# Patient Record
Sex: MALE | Race: WHITE | NOT HISPANIC OR LATINO | Employment: UNEMPLOYED | URBAN - METROPOLITAN AREA
[De-identification: names, ages, dates, MRNs, and addresses within clinical notes are randomized per-mention and may not be internally consistent; named-entity substitution may affect disease eponyms.]

---

## 2024-10-25 ENCOUNTER — APPOINTMENT (EMERGENCY)
Dept: RADIOLOGY | Facility: HOSPITAL | Age: 44
End: 2024-10-25
Payer: COMMERCIAL

## 2024-10-25 ENCOUNTER — HOSPITAL ENCOUNTER (INPATIENT)
Facility: HOSPITAL | Age: 44
LOS: 2 days | Discharge: HOME | DRG: 641 | End: 2024-10-27
Attending: INTERNAL MEDICINE | Admitting: INTERNAL MEDICINE
Payer: COMMERCIAL

## 2024-10-25 ENCOUNTER — HOSPITAL ENCOUNTER (EMERGENCY)
Facility: HOSPITAL | Age: 44
End: 2024-10-25
Attending: EMERGENCY MEDICINE
Payer: COMMERCIAL

## 2024-10-25 VITALS
HEIGHT: 76 IN | WEIGHT: 215 LBS | RESPIRATION RATE: 13 BRPM | BODY MASS INDEX: 26.18 KG/M2 | HEART RATE: 83 BPM | OXYGEN SATURATION: 97 % | TEMPERATURE: 99.2 F | SYSTOLIC BLOOD PRESSURE: 146 MMHG | DIASTOLIC BLOOD PRESSURE: 76 MMHG

## 2024-10-25 DIAGNOSIS — E87.1 HYPONATREMIA: Primary | ICD-10-CM

## 2024-10-25 DIAGNOSIS — E87.29 ALCOHOLIC KETOACIDOSIS: Primary | ICD-10-CM

## 2024-10-25 DIAGNOSIS — R10.13 EPIGASTRIC PAIN: ICD-10-CM

## 2024-10-25 DIAGNOSIS — E87.8 ELECTROLYTE ABNORMALITY: ICD-10-CM

## 2024-10-25 PROBLEM — R79.89 ELEVATED LFTS: Status: ACTIVE | Noted: 2024-10-25

## 2024-10-25 PROBLEM — F10.939 ALCOHOL WITHDRAWAL (HCC): Status: ACTIVE | Noted: 2024-10-25

## 2024-10-25 PROBLEM — D69.6 THROMBOCYTOPENIA (HCC): Status: ACTIVE | Noted: 2024-10-25

## 2024-10-25 LAB
ALBUMIN SERPL BCG-MCNC: 5 G/DL (ref 3.5–5)
ALP SERPL-CCNC: 85 U/L (ref 34–104)
ALT SERPL W P-5'-P-CCNC: 290 U/L (ref 7–52)
ANION GAP SERPL CALCULATED.3IONS-SCNC: 18 MMOL/L (ref 4–13)
ANION GAP SERPL CALCULATED.3IONS-SCNC: 28 MMOL/L (ref 4–13)
APTT PPP: 25 SECONDS (ref 23–34)
AST SERPL W P-5'-P-CCNC: 313 U/L (ref 13–39)
ATRIAL RATE: 99 BPM
B-OH-BUTYR SERPL-MCNC: 4.75 MMOL/L (ref 0.02–0.27)
BACTERIA UR QL AUTO: ABNORMAL /HPF
BASE EX.OXY STD BLDV CALC-SCNC: 94.7 % (ref 60–80)
BASE EXCESS BLDV CALC-SCNC: -4.9 MMOL/L
BASOPHILS # BLD AUTO: 0.07 THOUSANDS/ΜL (ref 0–0.1)
BASOPHILS NFR BLD AUTO: 1 % (ref 0–1)
BILIRUB SERPL-MCNC: 1.46 MG/DL (ref 0.2–1)
BILIRUB UR QL STRIP: ABNORMAL
BUN SERPL-MCNC: 12 MG/DL (ref 5–25)
BUN SERPL-MCNC: 15 MG/DL (ref 5–25)
CALCIUM SERPL-MCNC: 7.9 MG/DL (ref 8.4–10.2)
CALCIUM SERPL-MCNC: 8.9 MG/DL (ref 8.4–10.2)
CHLORIDE SERPL-SCNC: 85 MMOL/L (ref 96–108)
CHLORIDE SERPL-SCNC: 90 MMOL/L (ref 96–108)
CLARITY UR: CLEAR
CO2 SERPL-SCNC: 18 MMOL/L (ref 21–32)
CO2 SERPL-SCNC: 20 MMOL/L (ref 21–32)
COLOR UR: ABNORMAL
CREAT SERPL-MCNC: 0.76 MG/DL (ref 0.6–1.3)
CREAT SERPL-MCNC: 0.92 MG/DL (ref 0.6–1.3)
EOSINOPHIL # BLD AUTO: 0.04 THOUSAND/ΜL (ref 0–0.61)
EOSINOPHIL NFR BLD AUTO: 1 % (ref 0–6)
ERYTHROCYTE [DISTWIDTH] IN BLOOD BY AUTOMATED COUNT: 11.4 % (ref 11.6–15.1)
ETHANOL SERPL-MCNC: 190 MG/DL
FINE GRAN CASTS URNS QL MICRO: ABNORMAL /LPF
GFR SERPL CREATININE-BSD FRML MDRD: 102 ML/MIN/1.73SQ M
GFR SERPL CREATININE-BSD FRML MDRD: 112 ML/MIN/1.73SQ M
GLUCOSE SERPL-MCNC: 135 MG/DL (ref 65–140)
GLUCOSE SERPL-MCNC: 147 MG/DL (ref 65–140)
GLUCOSE UR STRIP-MCNC: NEGATIVE MG/DL
HCO3 BLDV-SCNC: 18.3 MMOL/L (ref 24–30)
HCT VFR BLD AUTO: 45.8 % (ref 36.5–49.3)
HGB BLD-MCNC: 16.7 G/DL (ref 12–17)
HGB UR QL STRIP.AUTO: ABNORMAL
HYALINE CASTS #/AREA URNS LPF: ABNORMAL /LPF
IMM GRANULOCYTES # BLD AUTO: 0.09 THOUSAND/UL (ref 0–0.2)
IMM GRANULOCYTES NFR BLD AUTO: 1 % (ref 0–2)
INR PPP: 0.98 (ref 0.85–1.19)
KETONES UR STRIP-MCNC: ABNORMAL MG/DL
LACTATE SERPL-SCNC: 4.1 MMOL/L (ref 0.5–2)
LACTATE SERPL-SCNC: 5.7 MMOL/L (ref 0.5–2)
LACTATE SERPL-SCNC: 6.3 MMOL/L (ref 0.5–2)
LEUKOCYTE ESTERASE UR QL STRIP: NEGATIVE
LIPASE SERPL-CCNC: 54 U/L (ref 11–82)
LYMPHOCYTES # BLD AUTO: 1.11 THOUSANDS/ΜL (ref 0.6–4.47)
LYMPHOCYTES NFR BLD AUTO: 14 % (ref 14–44)
MAGNESIUM SERPL-MCNC: 1.5 MG/DL (ref 1.9–2.7)
MAGNESIUM SERPL-MCNC: 2.3 MG/DL (ref 1.9–2.7)
MCH RBC QN AUTO: 31.2 PG (ref 26.8–34.3)
MCHC RBC AUTO-ENTMCNC: 36.5 G/DL (ref 31.4–37.4)
MCV RBC AUTO: 85 FL (ref 82–98)
MONOCYTES # BLD AUTO: 0.49 THOUSAND/ΜL (ref 0.17–1.22)
MONOCYTES NFR BLD AUTO: 6 % (ref 4–12)
NEUTROPHILS # BLD AUTO: 6.34 THOUSANDS/ΜL (ref 1.85–7.62)
NEUTS SEG NFR BLD AUTO: 77 % (ref 43–75)
NITRITE UR QL STRIP: NEGATIVE
NON-SQ EPI CELLS URNS QL MICRO: ABNORMAL /HPF
NRBC BLD AUTO-RTO: 0 /100 WBCS
O2 CT BLDV-SCNC: 21.1 ML/DL
P AXIS: 62 DEGREES
PCO2 BLDV: 29.9 MM HG (ref 42–50)
PH BLDV: 7.41 [PH] (ref 7.3–7.4)
PH UR STRIP.AUTO: 6 [PH]
PHOSPHATE SERPL-MCNC: 1.1 MG/DL (ref 2.7–4.5)
PLATELET # BLD AUTO: 82 THOUSANDS/UL (ref 149–390)
PMV BLD AUTO: 10.1 FL (ref 8.9–12.7)
PO2 BLDV: 92.5 MM HG (ref 35–45)
POTASSIUM SERPL-SCNC: 2.9 MMOL/L (ref 3.5–5.3)
POTASSIUM SERPL-SCNC: 3 MMOL/L (ref 3.5–5.3)
PR INTERVAL: 160 MS
PROT SERPL-MCNC: 7.5 G/DL (ref 6.4–8.4)
PROT UR STRIP-MCNC: ABNORMAL MG/DL
PROTHROMBIN TIME: 13.4 SECONDS (ref 12.3–15)
QRS AXIS: 26 DEGREES
QRSD INTERVAL: 102 MS
QT INTERVAL: 372 MS
QTC INTERVAL: 477 MS
RBC # BLD AUTO: 5.36 MILLION/UL (ref 3.88–5.62)
RBC #/AREA URNS AUTO: ABNORMAL /HPF
SODIUM 24H UR-SCNC: 15 MOL/L
SODIUM SERPL-SCNC: 128 MMOL/L (ref 135–147)
SODIUM SERPL-SCNC: 131 MMOL/L (ref 135–147)
SP GR UR STRIP.AUTO: >=1.03 (ref 1–1.03)
T WAVE AXIS: 55 DEGREES
URATE SERPL-MCNC: 7.8 MG/DL (ref 3.5–8.5)
UROBILINOGEN UR QL STRIP.AUTO: 0.2 E.U./DL
VENTRICULAR RATE: 99 BPM
WBC # BLD AUTO: 8.14 THOUSAND/UL (ref 4.31–10.16)
WBC #/AREA URNS AUTO: ABNORMAL /HPF

## 2024-10-25 PROCEDURE — 99222 1ST HOSP IP/OBS MODERATE 55: CPT | Performed by: NURSE PRACTITIONER

## 2024-10-25 PROCEDURE — 84550 ASSAY OF BLOOD/URIC ACID: CPT | Performed by: NURSE PRACTITIONER

## 2024-10-25 PROCEDURE — 96367 TX/PROPH/DG ADDL SEQ IV INF: CPT

## 2024-10-25 PROCEDURE — 83605 ASSAY OF LACTIC ACID: CPT | Performed by: EMERGENCY MEDICINE

## 2024-10-25 PROCEDURE — 93005 ELECTROCARDIOGRAM TRACING: CPT

## 2024-10-25 PROCEDURE — 82010 KETONE BODYS QUAN: CPT | Performed by: EMERGENCY MEDICINE

## 2024-10-25 PROCEDURE — 96366 THER/PROPH/DIAG IV INF ADDON: CPT

## 2024-10-25 PROCEDURE — 93010 ELECTROCARDIOGRAM REPORT: CPT | Performed by: INTERNAL MEDICINE

## 2024-10-25 PROCEDURE — 83036 HEMOGLOBIN GLYCOSYLATED A1C: CPT | Performed by: NURSE PRACTITIONER

## 2024-10-25 PROCEDURE — 81003 URINALYSIS AUTO W/O SCOPE: CPT | Performed by: EMERGENCY MEDICINE

## 2024-10-25 PROCEDURE — 84300 ASSAY OF URINE SODIUM: CPT | Performed by: NURSE PRACTITIONER

## 2024-10-25 PROCEDURE — 84100 ASSAY OF PHOSPHORUS: CPT | Performed by: NURSE PRACTITIONER

## 2024-10-25 PROCEDURE — 85730 THROMBOPLASTIN TIME PARTIAL: CPT | Performed by: EMERGENCY MEDICINE

## 2024-10-25 PROCEDURE — 83735 ASSAY OF MAGNESIUM: CPT | Performed by: EMERGENCY MEDICINE

## 2024-10-25 PROCEDURE — 96365 THER/PROPH/DIAG IV INF INIT: CPT

## 2024-10-25 PROCEDURE — 85025 COMPLETE CBC W/AUTO DIFF WBC: CPT | Performed by: EMERGENCY MEDICINE

## 2024-10-25 PROCEDURE — 85610 PROTHROMBIN TIME: CPT | Performed by: EMERGENCY MEDICINE

## 2024-10-25 PROCEDURE — 83935 ASSAY OF URINE OSMOLALITY: CPT | Performed by: NURSE PRACTITIONER

## 2024-10-25 PROCEDURE — 80053 COMPREHEN METABOLIC PANEL: CPT | Performed by: EMERGENCY MEDICINE

## 2024-10-25 PROCEDURE — 83735 ASSAY OF MAGNESIUM: CPT | Performed by: NURSE PRACTITIONER

## 2024-10-25 PROCEDURE — 80048 BASIC METABOLIC PNL TOTAL CA: CPT | Performed by: NURSE PRACTITIONER

## 2024-10-25 PROCEDURE — 82077 ASSAY SPEC XCP UR&BREATH IA: CPT | Performed by: EMERGENCY MEDICINE

## 2024-10-25 PROCEDURE — 83690 ASSAY OF LIPASE: CPT | Performed by: EMERGENCY MEDICINE

## 2024-10-25 PROCEDURE — 71045 X-RAY EXAM CHEST 1 VIEW: CPT

## 2024-10-25 PROCEDURE — 99284 EMERGENCY DEPT VISIT MOD MDM: CPT

## 2024-10-25 PROCEDURE — 36415 COLL VENOUS BLD VENIPUNCTURE: CPT | Performed by: EMERGENCY MEDICINE

## 2024-10-25 PROCEDURE — 81001 URINALYSIS AUTO W/SCOPE: CPT | Performed by: EMERGENCY MEDICINE

## 2024-10-25 PROCEDURE — 82805 BLOOD GASES W/O2 SATURATION: CPT | Performed by: EMERGENCY MEDICINE

## 2024-10-25 RX ORDER — FAMOTIDINE 10 MG/ML
20 INJECTION, SOLUTION INTRAVENOUS EVERY 12 HOURS SCHEDULED
Status: DISCONTINUED | OUTPATIENT
Start: 2024-10-25 | End: 2024-10-27 | Stop reason: HOSPADM

## 2024-10-25 RX ORDER — POTASSIUM CHLORIDE 14.9 MG/ML
20 INJECTION INTRAVENOUS ONCE
Status: COMPLETED | OUTPATIENT
Start: 2024-10-25 | End: 2024-10-26

## 2024-10-25 RX ORDER — LANOLIN ALCOHOL/MO/W.PET/CERES
100 CREAM (GRAM) TOPICAL DAILY
Status: DISCONTINUED | OUTPATIENT
Start: 2024-10-26 | End: 2024-10-27 | Stop reason: HOSPADM

## 2024-10-25 RX ORDER — DEXTROSE MONOHYDRATE AND SODIUM CHLORIDE 5; .9 G/100ML; G/100ML
200 INJECTION, SOLUTION INTRAVENOUS CONTINUOUS
Status: DISCONTINUED | OUTPATIENT
Start: 2024-10-25 | End: 2024-10-25 | Stop reason: HOSPADM

## 2024-10-25 RX ORDER — POTASSIUM CHLORIDE 29.8 MG/ML
40 INJECTION INTRAVENOUS ONCE
Status: DISCONTINUED | OUTPATIENT
Start: 2024-10-25 | End: 2024-10-25

## 2024-10-25 RX ORDER — ONDANSETRON 2 MG/ML
4 INJECTION INTRAMUSCULAR; INTRAVENOUS EVERY 6 HOURS PRN
Status: DISCONTINUED | OUTPATIENT
Start: 2024-10-25 | End: 2024-10-27 | Stop reason: HOSPADM

## 2024-10-25 RX ORDER — POTASSIUM CHLORIDE 1500 MG/1
40 TABLET, EXTENDED RELEASE ORAL ONCE
Status: COMPLETED | OUTPATIENT
Start: 2024-10-25 | End: 2024-10-25

## 2024-10-25 RX ORDER — DEXTROSE MONOHYDRATE AND SODIUM CHLORIDE 5; .9 G/100ML; G/100ML
150 INJECTION, SOLUTION INTRAVENOUS CONTINUOUS
Status: DISCONTINUED | OUTPATIENT
Start: 2024-10-25 | End: 2024-10-26

## 2024-10-25 RX ORDER — FOLIC ACID 1 MG/1
1 TABLET ORAL DAILY
Status: DISCONTINUED | OUTPATIENT
Start: 2024-10-26 | End: 2024-10-27 | Stop reason: HOSPADM

## 2024-10-25 RX ORDER — POTASSIUM CHLORIDE 14.9 MG/ML
20 INJECTION INTRAVENOUS ONCE
Status: COMPLETED | OUTPATIENT
Start: 2024-10-25 | End: 2024-10-25

## 2024-10-25 RX ORDER — CHLORDIAZEPOXIDE HYDROCHLORIDE 25 MG/1
25 CAPSULE, GELATIN COATED ORAL EVERY 6 HOURS SCHEDULED
Status: DISCONTINUED | OUTPATIENT
Start: 2024-10-25 | End: 2024-10-27

## 2024-10-25 RX ORDER — LABETALOL HYDROCHLORIDE 5 MG/ML
10 INJECTION, SOLUTION INTRAVENOUS EVERY 6 HOURS PRN
Status: DISCONTINUED | OUTPATIENT
Start: 2024-10-25 | End: 2024-10-27 | Stop reason: HOSPADM

## 2024-10-25 RX ORDER — MAGNESIUM SULFATE HEPTAHYDRATE 40 MG/ML
2 INJECTION, SOLUTION INTRAVENOUS ONCE
Status: COMPLETED | OUTPATIENT
Start: 2024-10-25 | End: 2024-10-26

## 2024-10-25 RX ADMIN — MAGNESIUM SULFATE HEPTAHYDRATE 2 G: 40 INJECTION, SOLUTION INTRAVENOUS at 22:43

## 2024-10-25 RX ADMIN — CHLORDIAZEPOXIDE HYDROCHLORIDE 25 MG: 25 CAPSULE ORAL at 20:33

## 2024-10-25 RX ADMIN — POTASSIUM CHLORIDE 40 MEQ: 1500 TABLET, EXTENDED RELEASE ORAL at 12:26

## 2024-10-25 RX ADMIN — DEXTROSE AND SODIUM CHLORIDE 200 ML/HR: 5; .9 INJECTION, SOLUTION INTRAVENOUS at 15:13

## 2024-10-25 RX ADMIN — FOLIC ACID 1 MG: 5 INJECTION, SOLUTION INTRAMUSCULAR; INTRAVENOUS; SUBCUTANEOUS at 13:53

## 2024-10-25 RX ADMIN — POTASSIUM PHOSPHATE, MONOBASIC AND POTASSIUM PHOSPHATE, DIBASIC 21 MMOL: 224; 236 INJECTION, SOLUTION, CONCENTRATE INTRAVENOUS at 23:15

## 2024-10-25 RX ADMIN — DEXTROSE AND SODIUM CHLORIDE 150 ML/HR: 5; .9 INJECTION, SOLUTION INTRAVENOUS at 20:07

## 2024-10-25 RX ADMIN — POTASSIUM CHLORIDE 20 MEQ: 14.9 INJECTION, SOLUTION INTRAVENOUS at 20:33

## 2024-10-25 RX ADMIN — SODIUM CHLORIDE 1000 ML: 0.9 INJECTION, SOLUTION INTRAVENOUS at 12:24

## 2024-10-25 RX ADMIN — POTASSIUM CHLORIDE 40 MEQ: 1500 TABLET, EXTENDED RELEASE ORAL at 22:35

## 2024-10-25 RX ADMIN — POTASSIUM CHLORIDE 20 MEQ: 14.9 INJECTION, SOLUTION INTRAVENOUS at 22:35

## 2024-10-25 RX ADMIN — THIAMINE HYDROCHLORIDE 100 MG: 100 INJECTION, SOLUTION INTRAMUSCULAR; INTRAVENOUS at 12:45

## 2024-10-25 RX ADMIN — FAMOTIDINE 20 MG: 10 INJECTION, SOLUTION INTRAVENOUS at 20:33

## 2024-10-25 NOTE — ED NOTES
Pt father, Donte, called asking update of status. Pt gave verbal consent to speak with him, asking for update when we know if he is discharged or admitted.      Mally Pena RN  10/25/24 [id number]

## 2024-10-25 NOTE — ED NOTES
Urinated in a cup and was attempting to throw it in the garbage when intercepted by RN.  Assisted back to bed and instructed to use call bell for assistance.  Urinal placed within reach     Nida Elliott RN  10/25/24 1155

## 2024-10-25 NOTE — EMTALA/ACUTE CARE TRANSFER
[facility] EMERGENCY DEPARTMENT  [address] [place] [id number]  Dept: [phone]      EMTALA TRANSFER CONSENT    NAME Tres Deutsch                                         1980                              MRN [id number]    I have been informed of my rights regarding examination, treatment, and transfer   by Dr. Mariajose Cota MD    Benefits: Specialized equipment and/or services available at the receiving facility (Include comment)________________________    Risks: Potential deterioration of medical condition, Loss of IV, Increased discomfort during transfer      Consent for Transfer:  I acknowledge that my medical condition has been evaluated and explained to me by the emergency department physician or other qualified medical person and/or my attending physician, who has recommended that I be transferred to the service of  Accepting Physician: Dr. Petersen at Accepting Facility Name, City & State : [facility]. The above potential benefits of such transfer, the potential risks associated with such transfer, and the probable risks of not being transferred have been explained to me, and I fully understand them.  The doctor has explained that, in my case, the benefits of transfer outweigh the risks.  I agree to be transferred.    I authorize the performance of emergency medical procedures and treatments upon me in both transit and upon arrival at the receiving facility.  Additionally, I authorize the release of any and all medical records to the receiving facility and request they be transported with me, if possible.  I understand that the safest mode of transportation during a medical emergency is an ambulance and that the Hospital advocates the use of this mode of transport. Risks of traveling to the receiving facility by car, including absence of medical control, life sustaining equipment, such as oxygen, and medical personnel has been explained to me and I fully understand  them.    (CARLOS ENRIQUE CORRECT BOX BELOW)  [  ]  I consent to the stated transfer and to be transported by ambulance/helicopter.  [  ]  I consent to the stated transfer, but refuse transportation by ambulance and accept full responsibility for my transportation by car.  I understand the risks of non-ambulance transfers and I exonerate the Hospital and its staff from any deterioration in my condition that results from this refusal.    X___________________________________________    DATE  10/25/24  TIME________  Signature of patient or legally responsible individual signing on patient behalf           RELATIONSHIP TO PATIENT_________________________          Provider Certification    NAME Tres Deutsch                                         1980                              MRN [id number]    A medical screening exam was performed on the above named patient.  Based on the examination:    Condition Necessitating Transfer The encounter diagnosis was Alcoholic ketoacidosis.    Patient Condition: The patient has been stabilized such that within reasonable medical probability, no material deterioration of the patient condition or the condition of the unborn child(nathan) is likely to result from the transfer    Reason for Transfer: Level of Care needed not available at this facility    Transfer Requirements: Facility SLW   Space available and qualified personnel available for treatment as acknowledged by    Agreed to accept transfer and to provide appropriate medical treatment as acknowledged by       Dr. Petersen  Appropriate medical records of the examination and treatment of the patient are provided at the time of transfer   STAFF INITIAL WHEN COMPLETED _______  Transfer will be performed by qualified personnel from    and appropriate transfer equipment as required, including the use of necessary and appropriate life support measures.    Provider Certification: I have examined the patient and explained the following  risks and benefits of being transferred/refusing transfer to the patient/family:  General risk, such as traffic hazards, adverse weather conditions, rough terrain or turbulence, possible failure of equipment (including vehicle or aircraft), or consequences of actions of persons outside the control of the transport personnel, Unanticipated needs of medical equipment and personnel during transport      Based on these reasonable risks and benefits to the patient and/or the unborn child(nathan), and based upon the information available at the time of the patient’s examination, I certify that the medical benefits reasonably to be expected from the provision of appropriate medical treatments at another medical facility outweigh the increasing risks, if any, to the individual’s medical condition, and in the case of labor to the unborn child, from effecting the transfer.    X____________________________________________ DATE 10/25/24        TIME_______      ORIGINAL - SEND TO MEDICAL RECORDS   COPY - SEND WITH PATIENT DURING TRANSFER

## 2024-10-25 NOTE — ED NOTES
Message received from Deonna in the lab for redraw of vbg.  Done and hand walked to lab. Lab also aware that extra blue tube for pt inr ptt redraw that was ordered.     Nida Elliott RN  10/25/24 1129       Nida Elliott RN  10/25/24 6705

## 2024-10-25 NOTE — ED PROVIDER NOTES
"  ED Disposition       None          Assessment & Plan       Medical Decision Making  44-year-old male sent in for evaluation for recent heavy alcohol use.  Patient is alert and oriented and ambulates steadily into the department but reports generally feeling poorly.  Moderately hypertensive with otherwise normal vitals on arrival.  Some scattered bruising on exam but soft nontender abdomen and clear lungs.  No appreciable jaundice.  Initial labs notable for hypokalemia to 3.0, anion gap of 28, bicarbonate of 18, elevated ketones, concerning for alcoholic ketoacidosis.  Initial lactate 6.3.  No suspicion for infectious process at this time.  1 L of IV fluids given.  Potassium repleted.  Thiamine and folate given followed by initiation of D5 normal saline infusion.  Chest x-ray shows no acute disease per my independent interpretation.  Repeat lactate at 1.5 hours decreased to 5.7.  I discussed the patient with the hospitalist at [facility] who is concerned that given patient's history of severe withdrawal he would be better served by admission at a hospital with ICU capabilities although he does not meet them at this time.  Will plan for transfer to [facility].  Accepted by Dr. Petersen.    Amount and/or Complexity of Data Reviewed  Labs: ordered.  Radiology: ordered.    Risk  Prescription drug management.             Medications - No data to display    ED Risk Strat Scores                                               History of Present Illness       Chief Complaint   Patient presents with    Alcohol Problem     Patient brought in by EMS from home due to concerns of alcohol intoxication. EMS was called by family (living in [place]) because they didn't hear from patient for several days. Upon arrival to pt's apartment, pt found asleep in bed. Patient states, \"I just got a new apartment and wanted to celebrate\". Pt recently discharged from Rehab. Pt reports drinking 3-4 days a week and about a fifth of Vodka each " day. Last drink around 0700.     Fall     Unknown fall. Bruising present to R flank and multiple abrasions to pt's toes.        Past Medical History:   Diagnosis Date    Alcohol abuse       No past surgical history on file.   No family history on file.       E-Cigarette/Vaping      E-Cigarette/Vaping Substances      I have reviewed and agree with the history as documented.     44-year-old male history of alcohol use disorder presents with complaint of alcohol intoxication.  Family was concerned that they had not heard from him in a few days went to check on him and found him in bed.  He reports having some minor falls in his home recently.  He also complains of feeling thirsty.  He denies hitting his head.  He reports recently getting out of rehab and is not seeking to return to rehab at this time.  Patient states that he was in [place] out of state [place] days and just moved back to the area this past Saturday which was when he started drinking again.  He has been drinking approximately 1/5 of vodka daily.  He does have a history of withdrawal seizures and severe withdrawal requiring ICU.  Last drink was this morning.        Review of Systems   All other systems reviewed and are negative.          Objective       ED Triage Vitals [10/25/24 1128]   Temperature Pulse Blood Pressure Respirations SpO2 Patient Position - Orthostatic VS   99.2 °F (37.3 °C) 93 168/92 14 96 % Lying      Temp Source Heart Rate Source BP Location FiO2 (%) Pain Score    Axillary Monitor Left arm -- No Pain      Vitals      Date and Time Temp Pulse SpO2 Resp BP Pain Score FACES Pain Rating User   10/25/24 1128 99.2 °F (37.3 °C) 93 96 % 14 168/92 No Pain -- DG            Physical Exam  Constitutional:       General: He is not in acute distress.     Comments: Tired appearing   HENT:      Head: Normocephalic and atraumatic.      Mouth/Throat:      Mouth: Mucous membranes are dry.   Eyes:      Conjunctiva/sclera: Conjunctivae normal.       Pupils: Pupils are equal, round, and reactive to light.   Cardiovascular:      Rate and Rhythm: Normal rate and regular rhythm.      Heart sounds: Normal heart sounds.   Pulmonary:      Effort: Pulmonary effort is normal.      Breath sounds: Normal breath sounds.   Abdominal:      Palpations: Abdomen is soft.      Tenderness: There is no abdominal tenderness.      Comments: Scattered bruises to patient's flanks   Musculoskeletal:      Cervical back: Neck supple.   Skin:     General: Skin is warm and dry.      Coloration: Skin is not jaundiced.      Findings: Bruising present.   Neurological:      General: No focal deficit present.      Mental Status: He is alert and oriented to person, place, and time.      Comments: Ambulated steadily into treatment room   Psychiatric:         Behavior: Behavior normal.         Results Reviewed       Procedure Component Value Units Date/Time    CBC and differential [[id number]]     Lab Status: No result Specimen: Blood     Comprehensive metabolic panel [[id number]]     Lab Status: No result Specimen: Blood     Magnesium [[id number]]     Lab Status: No result Specimen: Blood     UA (URINE) with reflex to Scope [[id number]]     Lab Status: No result Specimen: Urine             XR chest 1 view portable    (Results Pending)       CriticalCare Time    Date/Time: 10/25/2024 8:03 PM    Performed by: Mariajose Cota MD  Authorized by: Mariajose Cota MD    Critical care provider statement:     Critical care time (minutes):  30    Critical care time was exclusive of:  Separately billable procedures and treating other patients    Critical care was necessary to treat or prevent imminent or life-threatening deterioration of the following conditions:  Metabolic crisis and dehydration    Critical care was time spent personally by me on the following activities:  Obtaining history from patient or surrogate, development of treatment plan with patient or surrogate, discussions with  consultants, evaluation of patient's response to treatment, examination of patient, ordering and review of laboratory studies, ordering and performing treatments and interventions and re-evaluation of patient's condition      ED Medication and Procedure Management   None     Patient's Medications    No medications on file     No discharge procedures on file.  ED SEPSIS DOCUMENTATION            Mariajose Cota MD  10/25/24 2003

## 2024-10-26 ENCOUNTER — APPOINTMENT (OUTPATIENT)
Dept: RADIOLOGY | Facility: HOSPITAL | Age: 44
DRG: 641 | End: 2024-10-26
Payer: COMMERCIAL

## 2024-10-26 ENCOUNTER — APPOINTMENT (INPATIENT)
Dept: NON INVASIVE DIAGNOSTICS | Facility: HOSPITAL | Age: 44
DRG: 641 | End: 2024-10-26
Payer: COMMERCIAL

## 2024-10-26 PROBLEM — E87.8 ELECTROLYTE ABNORMALITY: Status: ACTIVE | Noted: 2024-10-26

## 2024-10-26 PROBLEM — R79.89 ELEVATED TROPONIN: Status: ACTIVE | Noted: 2024-10-26

## 2024-10-26 LAB
2HR DELTA HS TROPONIN: -315 NG/L
4HR DELTA HS TROPONIN: -128 NG/L
ALBUMIN SERPL BCG-MCNC: 3.8 G/DL (ref 3.5–5)
ALP SERPL-CCNC: 55 U/L (ref 34–104)
ALT SERPL W P-5'-P-CCNC: 189 U/L (ref 7–52)
ANION GAP SERPL CALCULATED.3IONS-SCNC: 11 MMOL/L (ref 4–13)
ANION GAP SERPL CALCULATED.3IONS-SCNC: 11 MMOL/L (ref 4–13)
ANION GAP SERPL CALCULATED.3IONS-SCNC: 16 MMOL/L (ref 4–13)
ANION GAP SERPL CALCULATED.3IONS-SCNC: 9 MMOL/L (ref 4–13)
ANION GAP SERPL CALCULATED.3IONS-SCNC: 9 MMOL/L (ref 4–13)
AORTIC ROOT: 4 CM
APICAL FOUR CHAMBER EJECTION FRACTION: 75 %
ASCENDING AORTA: 3.3 CM
AST SERPL W P-5'-P-CCNC: 212 U/L (ref 13–39)
ATRIAL RATE: 85 BPM
ATRIAL RATE: 89 BPM
B-OH-BUTYR SERPL-MCNC: 2.01 MMOL/L (ref 0.02–0.27)
BILIRUB SERPL-MCNC: 1.42 MG/DL (ref 0.2–1)
BSA FOR ECHO PROCEDURE: 2.28 M2
BUN SERPL-MCNC: 10 MG/DL (ref 5–25)
BUN SERPL-MCNC: 10 MG/DL (ref 5–25)
BUN SERPL-MCNC: 12 MG/DL (ref 5–25)
BUN SERPL-MCNC: 7 MG/DL (ref 5–25)
BUN SERPL-MCNC: 9 MG/DL (ref 5–25)
CALCIUM SERPL-MCNC: 7.5 MG/DL (ref 8.4–10.2)
CALCIUM SERPL-MCNC: 7.7 MG/DL (ref 8.4–10.2)
CALCIUM SERPL-MCNC: 7.7 MG/DL (ref 8.4–10.2)
CALCIUM SERPL-MCNC: 8.1 MG/DL (ref 8.4–10.2)
CALCIUM SERPL-MCNC: 8.9 MG/DL (ref 8.4–10.2)
CARDIAC TROPONIN I PNL SERPL HS: 193 NG/L
CARDIAC TROPONIN I PNL SERPL HS: 321 NG/L
CARDIAC TROPONIN I PNL SERPL HS: 6 NG/L
CHLORIDE SERPL-SCNC: 92 MMOL/L (ref 96–108)
CHLORIDE SERPL-SCNC: 93 MMOL/L (ref 96–108)
CHLORIDE SERPL-SCNC: 93 MMOL/L (ref 96–108)
CHLORIDE SERPL-SCNC: 94 MMOL/L (ref 96–108)
CHLORIDE SERPL-SCNC: 96 MMOL/L (ref 96–108)
CHOLEST SERPL-MCNC: 225 MG/DL
CO2 SERPL-SCNC: 20 MMOL/L (ref 21–32)
CO2 SERPL-SCNC: 25 MMOL/L (ref 21–32)
CO2 SERPL-SCNC: 25 MMOL/L (ref 21–32)
CO2 SERPL-SCNC: 26 MMOL/L (ref 21–32)
CO2 SERPL-SCNC: 27 MMOL/L (ref 21–32)
CREAT SERPL-MCNC: 0.72 MG/DL (ref 0.6–1.3)
CREAT SERPL-MCNC: 0.75 MG/DL (ref 0.6–1.3)
CREAT SERPL-MCNC: 0.78 MG/DL (ref 0.6–1.3)
CREAT SERPL-MCNC: 0.78 MG/DL (ref 0.6–1.3)
CREAT SERPL-MCNC: 0.8 MG/DL (ref 0.6–1.3)
E WAVE DECELERATION TIME: 148 MS
E/A RATIO: 1.32
ERYTHROCYTE [DISTWIDTH] IN BLOOD BY AUTOMATED COUNT: 11.7 % (ref 11.6–15.1)
EST. AVERAGE GLUCOSE BLD GHB EST-MCNC: 111 MG/DL
FRACTIONAL SHORTENING: 52 (ref 28–44)
GFR SERPL CREATININE-BSD FRML MDRD: 110 ML/MIN/1.73SQ M
GFR SERPL CREATININE-BSD FRML MDRD: 111 ML/MIN/1.73SQ M
GFR SERPL CREATININE-BSD FRML MDRD: 111 ML/MIN/1.73SQ M
GFR SERPL CREATININE-BSD FRML MDRD: 113 ML/MIN/1.73SQ M
GFR SERPL CREATININE-BSD FRML MDRD: 115 ML/MIN/1.73SQ M
GLUCOSE SERPL-MCNC: 123 MG/DL (ref 65–140)
GLUCOSE SERPL-MCNC: 134 MG/DL (ref 65–140)
GLUCOSE SERPL-MCNC: 145 MG/DL (ref 65–140)
GLUCOSE SERPL-MCNC: 174 MG/DL (ref 65–140)
GLUCOSE SERPL-MCNC: 174 MG/DL (ref 65–140)
HBA1C MFR BLD: 5.5 %
HCT VFR BLD AUTO: 35.4 % (ref 36.5–49.3)
HDLC SERPL-MCNC: 79 MG/DL
HGB BLD-MCNC: 13.1 G/DL (ref 12–17)
INTERVENTRICULAR SEPTUM IN DIASTOLE (PARASTERNAL SHORT AXIS VIEW): 0.9 CM
INTERVENTRICULAR SEPTUM: 0.9 CM (ref 0.6–1.1)
LAAS-AP2: 26.4 CM2
LAAS-AP4: 19.6 CM2
LACTATE SERPL-SCNC: 1.5 MMOL/L (ref 0.5–2)
LACTATE SERPL-SCNC: 2.9 MMOL/L (ref 0.5–2)
LDLC SERPL CALC-MCNC: 135 MG/DL (ref 0–100)
LEFT ATRIUM SIZE: 4 CM
LEFT ATRIUM VOLUME (MOD BIPLANE): 71 ML
LEFT ATRIUM VOLUME INDEX (MOD BIPLANE): 31.1 ML/M2
LEFT INTERNAL DIMENSION IN SYSTOLE: 2.5 CM (ref 2.1–4)
LEFT VENTRICULAR INTERNAL DIMENSION IN DIASTOLE: 5.2 CM (ref 3.5–6)
LEFT VENTRICULAR POSTERIOR WALL IN END DIASTOLE: 1 CM
LEFT VENTRICULAR STROKE VOLUME: 107 ML
LVSV (TEICH): 107 ML
MAGNESIUM SERPL-MCNC: 1.9 MG/DL (ref 1.9–2.7)
MCH RBC QN AUTO: 31 PG (ref 26.8–34.3)
MCHC RBC AUTO-ENTMCNC: 36.7 G/DL (ref 31.4–37.4)
MCV RBC AUTO: 85 FL (ref 82–98)
MV E'TISSUE VEL-LAT: 16 CM/S
MV E'TISSUE VEL-SEP: 12 CM/S
MV PEAK A VEL: 0.59 M/S
MV PEAK E VEL: 78 CM/S
MV STENOSIS PRESSURE HALF TIME: 43 MS
MV VALVE AREA P 1/2 METHOD: 5.12
OSMOLALITY UR/SERPL-RTO: 294 MMOL/KG (ref 282–298)
OSMOLALITY UR: 823 MMOL/KG
P AXIS: 47 DEGREES
P AXIS: 57 DEGREES
PLATELET # BLD AUTO: 48 THOUSANDS/UL (ref 149–390)
PMV BLD AUTO: 10.1 FL (ref 8.9–12.7)
POTASSIUM SERPL-SCNC: 2.8 MMOL/L (ref 3.5–5.3)
POTASSIUM SERPL-SCNC: 3 MMOL/L (ref 3.5–5.3)
POTASSIUM SERPL-SCNC: 3 MMOL/L (ref 3.5–5.3)
POTASSIUM SERPL-SCNC: 3.3 MMOL/L (ref 3.5–5.3)
POTASSIUM SERPL-SCNC: 3.6 MMOL/L (ref 3.5–5.3)
PR INTERVAL: 152 MS
PR INTERVAL: 158 MS
PROT SERPL-MCNC: 5.6 G/DL (ref 6.4–8.4)
QRS AXIS: 28 DEGREES
QRS AXIS: 31 DEGREES
QRSD INTERVAL: 98 MS
QRSD INTERVAL: 98 MS
QT INTERVAL: 380 MS
QT INTERVAL: 388 MS
QTC INTERVAL: 461 MS
QTC INTERVAL: 462 MS
RBC # BLD AUTO: 4.19 MILLION/UL (ref 3.88–5.62)
RIGHT ATRIUM AREA SYSTOLE A4C: 13.8 CM2
RIGHT VENTRICLE ID DIMENSION: 3.3 CM
SINOTUBULAR JUNCTION: 3.5 CM
SL CV LEFT ATRIUM LENGTH A2C: 5.7 CM
SL CV PED ECHO LEFT VENTRICLE DIASTOLIC VOLUME (MOD BIPLANE) 2D: 129 ML
SL CV PED ECHO LEFT VENTRICLE SYSTOLIC VOLUME (MOD BIPLANE) 2D: 22 ML
SL CV SINUS OF VALSALVA 2D: 4.3 CM
SODIUM SERPL-SCNC: 128 MMOL/L (ref 135–147)
SODIUM SERPL-SCNC: 129 MMOL/L (ref 135–147)
SODIUM SERPL-SCNC: 129 MMOL/L (ref 135–147)
SODIUM SERPL-SCNC: 130 MMOL/L (ref 135–147)
SODIUM SERPL-SCNC: 131 MMOL/L (ref 135–147)
STJ: 3.5 CM
T WAVE AXIS: 39 DEGREES
T WAVE AXIS: 50 DEGREES
TR MAX PG: 17 MMHG
TR PEAK VELOCITY: 2.1 M/S
TRICUSPID ANNULAR PLANE SYSTOLIC EXCURSION: 1.8 CM
TRICUSPID VALVE PEAK REGURGITATION VELOCITY: 2.07 M/S
TRIGL SERPL-MCNC: 54 MG/DL
TSH SERPL DL<=0.05 MIU/L-ACNC: 3.14 UIU/ML (ref 0.45–4.5)
VENTRICULAR RATE: 85 BPM
VENTRICULAR RATE: 89 BPM
WBC # BLD AUTO: 7.39 THOUSAND/UL (ref 4.31–10.16)

## 2024-10-26 PROCEDURE — 82010 KETONE BODYS QUAN: CPT | Performed by: NURSE PRACTITIONER

## 2024-10-26 PROCEDURE — 80061 LIPID PANEL: CPT | Performed by: NURSE PRACTITIONER

## 2024-10-26 PROCEDURE — 83735 ASSAY OF MAGNESIUM: CPT | Performed by: NURSE PRACTITIONER

## 2024-10-26 PROCEDURE — 84443 ASSAY THYROID STIM HORMONE: CPT | Performed by: NURSE PRACTITIONER

## 2024-10-26 PROCEDURE — 83605 ASSAY OF LACTIC ACID: CPT | Performed by: NURSE PRACTITIONER

## 2024-10-26 PROCEDURE — 80048 BASIC METABOLIC PNL TOTAL CA: CPT | Performed by: NURSE PRACTITIONER

## 2024-10-26 PROCEDURE — 93306 TTE W/DOPPLER COMPLETE: CPT

## 2024-10-26 PROCEDURE — 84484 ASSAY OF TROPONIN QUANT: CPT | Performed by: NURSE PRACTITIONER

## 2024-10-26 PROCEDURE — 93306 TTE W/DOPPLER COMPLETE: CPT | Performed by: INTERNAL MEDICINE

## 2024-10-26 PROCEDURE — 85027 COMPLETE CBC AUTOMATED: CPT | Performed by: NURSE PRACTITIONER

## 2024-10-26 PROCEDURE — 93005 ELECTROCARDIOGRAM TRACING: CPT

## 2024-10-26 PROCEDURE — 80053 COMPREHEN METABOLIC PANEL: CPT | Performed by: NURSE PRACTITIONER

## 2024-10-26 PROCEDURE — 83930 ASSAY OF BLOOD OSMOLALITY: CPT | Performed by: NURSE PRACTITIONER

## 2024-10-26 PROCEDURE — 76705 ECHO EXAM OF ABDOMEN: CPT

## 2024-10-26 PROCEDURE — 80048 BASIC METABOLIC PNL TOTAL CA: CPT | Performed by: INTERNAL MEDICINE

## 2024-10-26 RX ORDER — ASPIRIN 325 MG
325 TABLET ORAL ONCE
Status: COMPLETED | OUTPATIENT
Start: 2024-10-26 | End: 2024-10-26

## 2024-10-26 RX ORDER — POTASSIUM CHLORIDE 1500 MG/1
40 TABLET, EXTENDED RELEASE ORAL ONCE
Status: COMPLETED | OUTPATIENT
Start: 2024-10-26 | End: 2024-10-26

## 2024-10-26 RX ORDER — POTASSIUM CHLORIDE 29.8 MG/ML
40 INJECTION INTRAVENOUS ONCE
Status: DISCONTINUED | OUTPATIENT
Start: 2024-10-26 | End: 2024-10-26 | Stop reason: CLARIF

## 2024-10-26 RX ORDER — DEXTROSE MONOHYDRATE AND SODIUM CHLORIDE 5; .9 G/100ML; G/100ML
150 INJECTION, SOLUTION INTRAVENOUS CONTINUOUS
Status: DISCONTINUED | OUTPATIENT
Start: 2024-10-26 | End: 2024-10-26

## 2024-10-26 RX ORDER — POTASSIUM CHLORIDE 14.9 MG/ML
20 INJECTION INTRAVENOUS ONCE
Status: COMPLETED | OUTPATIENT
Start: 2024-10-26 | End: 2024-10-26

## 2024-10-26 RX ORDER — GINSENG 100 MG
1 CAPSULE ORAL DAILY
Status: DISCONTINUED | OUTPATIENT
Start: 2024-10-26 | End: 2024-10-27 | Stop reason: HOSPADM

## 2024-10-26 RX ORDER — SODIUM CHLORIDE 9 MG/ML
75 INJECTION, SOLUTION INTRAVENOUS CONTINUOUS
Status: DISCONTINUED | OUTPATIENT
Start: 2024-10-26 | End: 2024-10-27

## 2024-10-26 RX ADMIN — CHLORDIAZEPOXIDE HYDROCHLORIDE 25 MG: 25 CAPSULE ORAL at 23:17

## 2024-10-26 RX ADMIN — SODIUM CHLORIDE 75 ML/HR: 0.9 INJECTION, SOLUTION INTRAVENOUS at 23:17

## 2024-10-26 RX ADMIN — ASPIRIN 325 MG: 325 TABLET ORAL at 01:18

## 2024-10-26 RX ADMIN — CHLORDIAZEPOXIDE HYDROCHLORIDE 25 MG: 25 CAPSULE ORAL at 00:00

## 2024-10-26 RX ADMIN — POTASSIUM CHLORIDE 20 MEQ: 14.9 INJECTION, SOLUTION INTRAVENOUS at 14:46

## 2024-10-26 RX ADMIN — POTASSIUM CHLORIDE 40 MEQ: 1500 TABLET, EXTENDED RELEASE ORAL at 12:35

## 2024-10-26 RX ADMIN — FOLIC ACID 1 MG: 1 TABLET ORAL at 08:11

## 2024-10-26 RX ADMIN — POTASSIUM CHLORIDE 20 MEQ: 14.9 INJECTION, SOLUTION INTRAVENOUS at 12:36

## 2024-10-26 RX ADMIN — SODIUM CHLORIDE 75 ML/HR: 0.9 INJECTION, SOLUTION INTRAVENOUS at 09:15

## 2024-10-26 RX ADMIN — POTASSIUM CHLORIDE 40 MEQ: 1500 TABLET, EXTENDED RELEASE ORAL at 17:25

## 2024-10-26 RX ADMIN — CHLORDIAZEPOXIDE HYDROCHLORIDE 25 MG: 25 CAPSULE ORAL at 12:24

## 2024-10-26 RX ADMIN — THIAMINE HCL TAB 100 MG 100 MG: 100 TAB at 08:11

## 2024-10-26 RX ADMIN — FAMOTIDINE 20 MG: 10 INJECTION, SOLUTION INTRAVENOUS at 08:11

## 2024-10-26 RX ADMIN — DEXTROSE AND SODIUM CHLORIDE 150 ML/HR: 5; .9 INJECTION, SOLUTION INTRAVENOUS at 05:05

## 2024-10-26 RX ADMIN — CHLORDIAZEPOXIDE HYDROCHLORIDE 25 MG: 25 CAPSULE ORAL at 05:06

## 2024-10-26 RX ADMIN — Medication 1 TABLET: at 08:11

## 2024-10-26 RX ADMIN — CHLORDIAZEPOXIDE HYDROCHLORIDE 25 MG: 25 CAPSULE ORAL at 17:25

## 2024-10-26 RX ADMIN — FAMOTIDINE 20 MG: 10 INJECTION, SOLUTION INTRAVENOUS at 20:08

## 2024-10-26 RX ADMIN — BACITRACIN ZINC 1 SMALL APPLICATION: 500 OINTMENT TOPICAL at 08:11

## 2024-10-26 NOTE — ASSESSMENT & PLAN NOTE
Patient noted to have hypokalemia, hypophosphatemia and hypomagnesemia.  Patient received IV magnesium sulfate and potassium supplementation  Potassium level still low 2.8 and magnesium level was normal.  Repeat lab values in a.m.

## 2024-10-26 NOTE — ASSESSMENT & PLAN NOTE
Platelet count 82-48.  Drop in platelet count likely secondary dilution from IV fluids patient has been on D5 NS at 150 mL/h with persistent hyponatremia  Due to alcohol use.

## 2024-10-26 NOTE — ASSESSMENT & PLAN NOTE
Sodium 131.  Suspect due to low solute.  Received NS 1000 mL and started on D5 NS in ED.  Will check BMP stat.  Further management pending on repeat BMP.

## 2024-10-26 NOTE — PLAN OF CARE
Problem: PAIN - ADULT  Goal: Verbalizes/displays adequate comfort level or baseline comfort level  Description: Interventions:  - Encourage patient to monitor pain and request assistance  - Assess pain using appropriate pain scale  - Administer analgesics based on type and severity of pain and evaluate response  - Implement non-pharmacological measures as appropriate and evaluate response  - Consider cultural and social influences on pain and pain management  - Notify physician/advanced practitioner if interventions unsuccessful or patient reports new pain  Outcome: Progressing     Problem: INFECTION - ADULT  Goal: Absence or prevention of progression during hospitalization  Description: INTERVENTIONS:  - Assess and monitor for signs and symptoms of infection  - Monitor lab/diagnostic results  - Monitor all insertion sites, i.e. indwelling lines, tubes, and drains  - Monitor endotracheal if appropriate and nasal secretions for changes in amount and color  - Solomon appropriate cooling/warming therapies per order  - Administer medications as ordered  - Instruct and encourage patient and family to use good hand hygiene technique  - Identify and instruct in appropriate isolation precautions for identified infection/condition  Outcome: Progressing  Goal: Absence of fever/infection during neutropenic period  Description: INTERVENTIONS:  - Monitor WBC    Outcome: Progressing     Problem: SAFETY ADULT  Goal: Patient will remain free of falls  Description: INTERVENTIONS:  - Educate patient/family on patient safety including physical limitations  - Instruct patient to call for assistance with activity   - Consult OT/PT to assist with strengthening/mobility   - Keep Call bell within reach  - Keep bed low and locked with side rails adjusted as appropriate  - Keep care items and personal belongings within reach  - Initiate and maintain comfort rounds  - Make Fall Risk Sign visible to staff  - Initiate/Maintain bed alarm  -  Apply yellow socks and bracelet for high fall risk patients  - Consider moving patient to room near nurses station  Outcome: Progressing  Goal: Maintain or return to baseline ADL function  Description: INTERVENTIONS:  -  Assess patient's ability to carry out ADLs; assess patient's baseline for ADL function and identify physical deficits which impact ability to perform ADLs (bathing, care of mouth/teeth, toileting, grooming, dressing, etc.)  - Assess/evaluate cause of self-care deficits   - Assess range of motion  - Assess patient's mobility; develop plan if impaired  - Assess patient's need for assistive devices and provide as appropriate  - Encourage maximum independence but intervene and supervise when necessary  - Involve family in performance of ADLs  - Assess for home care needs following discharge   - Consider OT consult to assist with ADL evaluation and planning for discharge  - Provide patient education as appropriate  Outcome: Progressing  Goal: Maintains/Returns to pre admission functional level  Description: INTERVENTIONS:  - Perform AM-PAC 6 Click Basic Mobility/ Daily Activity assessment daily.  - Set and communicate daily mobility goal to care team and patient/family/caregiver.   - Collaborate with rehabilitation services on mobility goals if consulted  - Out of bed for toileting  - Record patient progress and toleration of activity level   Outcome: Progressing     Problem: Knowledge Deficit  Goal: Patient/family/caregiver demonstrates understanding of disease process, treatment plan, medications, and discharge instructions  Description: Complete learning assessment and assess knowledge base.  Interventions:  - Provide teaching at level of understanding  - Provide teaching via preferred learning methods  Outcome: Progressing     Problem: METABOLIC, FLUID AND ELECTROLYTES - ADULT  Goal: Electrolytes maintained within normal limits  Description: INTERVENTIONS:  - Monitor labs and assess patient for signs  and symptoms of electrolyte imbalances  - Administer electrolyte replacement as ordered  - Monitor response to electrolyte replacements, including repeat lab results as appropriate  - Instruct patient on fluid and nutrition as appropriate  Outcome: Progressing  Goal: Fluid balance maintained  Description: INTERVENTIONS:  - Monitor labs   - Monitor I/O and WT  - Instruct patient on fluid and nutrition as appropriate  - Assess for signs & symptoms of volume excess or deficit  Outcome: Progressing  Goal: Glucose maintained within target range  Description: INTERVENTIONS:  - Monitor Blood Glucose as ordered  - Assess for signs and symptoms of hyperglycemia and hypoglycemia  - Administer ordered medications to maintain glucose within target range  - Assess nutritional intake and initiate nutrition service referral as needed  Outcome: Progressing     Problem: Nutrition/Hydration-ADULT  Goal: Nutrient/Hydration intake appropriate for improving, restoring or maintaining nutritional needs  Description: Monitor and assess patient's nutrition/hydration status for malnutrition. Collaborate with interdisciplinary team and initiate plan and interventions as ordered.  Monitor patient's weight and dietary intake as ordered or per policy. Utilize nutrition screening tool and intervene as necessary. Determine patient's food preferences and provide high-protein, high-caloric foods as appropriate.     INTERVENTIONS:  - Monitor oral intake, urinary output, labs, and treatment plans  - Assess nutrition and hydration status and recommend course of action  - Evaluate amount of meals eaten  - Assist patient with eating if necessary   - Allow adequate time for meals  - Recommend/ encourage appropriate diets, oral nutritional supplements, and vitamin/mineral supplements  - Order, calculate, and assess calorie counts as needed  - Recommend, monitor, and adjust tube feedings and TPN/PPN based on assessed needs  - Assess need for intravenous  fluids  - Provide specific nutrition/hydration education as appropriate  - Include patient/family/caregiver in decisions related to nutrition  Outcome: Progressing

## 2024-10-26 NOTE — ASSESSMENT & PLAN NOTE
, , TB 1.46.  Due to alcohol abuse  Reports epigastric pain since Saturday.  Trend LFTs  Alcohol cessation  Right upper quadrant ultrasound showed gallbladder sludge without acute cholecystitis and hepatic steatosis

## 2024-10-26 NOTE — ASSESSMENT & PLAN NOTE
Platelet count 82.  Due to alcohol use.  Check RUQ US to assess morphology of liver in view of thrombocytopenia and transaminitis.  Monitor platelet count

## 2024-10-26 NOTE — ASSESSMENT & PLAN NOTE
Sodium 131.  Suspect due to low solute.  Received NS 1000 mL and started on D5 NS in ED.    Patient has been on D5 NS at 150 mL/h with persistent hyponatremia  Will change IV fluids to NS at 75 mL/h  Results from last 7 days   Lab Units 10/26/24  1054 10/26/24  0516 10/26/24  0021 10/25/24  2050 10/25/24  1138   SODIUM mmol/L 130* 129*  129* 128* 128* 131*

## 2024-10-26 NOTE — ASSESSMENT & PLAN NOTE
"Patient presents with not feeling well, tremors, nausea dry heaving, not eating for 3 days.  Per ED note, family called EMS as they did not hear from him for several days. Patient reports he relapsed on drinking on Saturday last week after being sober for 6 months.  Was drinking 1/5th vodka daily for 4 days in a row, last drink was on Tuesday 3 days ago.  Patient reports he was only drinking liquids, no solid food for 3 days. Patient reports history of alcohol withdrawal required ICU admission.Has been to alcohol rehab in the past.  According to ED triage note, \"Patient was recently discharged from rehab.  Patient reports drinking 3 to 4 days a week and about 1/5th of Vodka each day. Last drink around 7 AM today\".  Alcohol level 190  AG 28, bicarb 18, acetone 4.75, initial lactic acid 6.3.  Indicating alcoholic ketoacidosis.  Management as below.  CIWA protocol  Start low-dose Librium  Telemetry  Monitor electrolytes, replete as indicated  IV Pepcid for GI prophylaxis  Alcohol cessation  Patient not interested in alcohol rehab       "

## 2024-10-26 NOTE — H&P
"H&P - Hospitalist   Name: Tres Deutsch 44 y.o. male I MRN: [id number]  Unit/Bed#: [address] [place] [address] Date of Admission: 10/25/2024   Date of Service: 10/25/2024 I Hospital Day: 0     Assessment & Plan  Alcohol withdrawal (HCC)  Patient presents with not feeling well, tremors, nausea dry heaving, not eating for 3 days.  Per ED note, family called EMS as they did not hear from him for several days. Patient reports he relapsed on drinking on Saturday last week after being sober for 6 months.  Was drinking 1/5th vodka daily for 4 days in a row, last drink was on Tuesday 3 days ago.  Patient reports he was only drinking liquids, no solid food for 3 days. Patient reports history of alcohol withdrawal required ICU admission.Has been to alcohol rehab in the past.  According to ED triage note, \"Patient was recently discharged from rehab.  Patient reports drinking 3 to 4 days a week and about 1/5th of Vodka each day. Last drink around 7 AM today\".  Alcohol level 190  AG 28, bicarb 18, acetone 4.75, initial lactic acid 6.3.  Indicating alcoholic ketoacidosis.  Management as below.  CIWA protocol  Start low-dose Librium  Telemetry  Monitor electrolytes, replete as indicated  IV Pepcid for GI prophylaxis  Alcohol cessation  Patient not interested in alcohol rehab       Alcoholic ketoacidosis  Given IV thiamine and D5 normal saline infusion in ED.  Will continue D5 NS at 150 cc per hour  Repeat BMP, lactic acid, acetone in the morning  Clear liquid diet, advance as tolerated  Elevated LFTs  , , TB 1.46.  Due to alcohol abuse  Reports epigastric pain since Saturday.  Trend LFTs  Alcohol cessation  Thrombocytopenia (HCC)  Platelet count 82.  Due to alcohol use.  Check RUQ US to assess morphology of liver in view of thrombocytopenia and transaminitis.  Monitor platelet count  Hyponatremia  Sodium 131.  Suspect due to low solute.  Received NS 1000 mL and started on D5 NS in ED.  Will check BMP stat.  Further " "management pending on repeat BMP.  Epigastric pain  Reports epigastric pain since Saturday.  Area is slight tender.  EKG showed NSR, possible inferior infarct.  Check troponin  Telemetry  IV Pepcid twice daily  Monitor for pain      VTE Pharmacologic Prophylaxis: VTE Score: 2 Low Risk (Score 0-2) - Encourage Ambulation.  Code Status: Level 1 - Full Code   Discussion with family:  NO.     Anticipated Length of Stay: Patient will be admitted on an inpatient basis with an anticipated length of stay of greater than 2 midnights secondary to alcohol withdrawal.    History of Present Illness   Chief Complaint: Not feeling well, tremors, nausea dry heaving, no eating for 3 days    Tres Deutsch is a 44 y.o. male with a PMH of alcohol abuse, alcohol withdrawal who presents with not feeling well, tremors, nausea dry heaving, not eating for 3 days.  Per ED note, family called EMS as they did not hear from him for several days.  Patient reports he relapsed on drinking on Saturday last week.  Was drinking 1/5th vodka daily for 4 days in a row, last drink was on Tuesday 3 days ago.  Reports was only drinking liquids, no solid food for 3 days.  Reports was sober for 6 months.  Patient reports history of alcohol withdrawal required ICU admission.Has been to alcohol rehab in the past.  Patient denies diarrhea constipation fever chills.  Denies chest pain, headache, dizziness, SOB.  Reports epigastric pain since Saturday.  Patient states he wants detox here but is not interested in alcohol rehab currently.  Patient states he just moved here from [place] last week.  No other complaints.    According to ED triage note, \"Patient was recently discharged from rehab.  Patient reports drinking 3 to 4 days a week and about 1/5th of Vodka each day. Last drink around 7 AM today\".      Review of Systems   Constitutional:  Positive for appetite change.        Generalized malaise   Gastrointestinal:  Positive for nausea.        " Epigastric pain since Saturday   Neurological:  Positive for tremors.   All other systems reviewed and are negative.      Historical Information   Past Medical History:   Diagnosis Date    Alcohol abuse      History reviewed. No pertinent surgical history.  Social History     Tobacco Use    Smoking status: Never    Smokeless tobacco: Never   Vaping Use    Vaping status: Never Used   Substance and Sexual Activity    Alcohol use: Yes    Drug use: Never    Sexual activity: Not on file     E-Cigarette/Vaping    E-Cigarette Use Never User      E-Cigarette/Vaping Substances    Nicotine No     THC No     CBD No     Flavoring No     Other No     Unknown No      Family history non-contributory  Social History:  Marital Status: Single   Occupation:   Patient Pre-hospital Living Situation: Home  Patient Pre-hospital Level of Mobility: walks  Patient Pre-hospital Diet Restrictions: Regular    Meds/Allergies   I have reviewed home medications with patient personally.  Prior to Admission medications    Not on File     No Known Allergies    Objective :  Temp:  [98.1 °F (36.7 °C)-99.2 °F (37.3 °C)] 98.1 °F (36.7 °C)  HR:  [83-95] 86  BP: (138-168)/(74-92) 138/76  Resp:  [13-22] 18  SpO2:  [94 %-98 %] 97 %  O2 Device: None (Room air)    Physical Exam  Vitals and nursing note reviewed.   Constitutional:       Appearance: He is well-developed.   HENT:      Head: Normocephalic and atraumatic.   Neck:      Thyroid: No thyromegaly.      Vascular: No JVD.      Trachea: No tracheal deviation.   Cardiovascular:      Rate and Rhythm: Regular rhythm. Tachycardia present.      Heart sounds: Normal heart sounds.   Pulmonary:      Effort: Pulmonary effort is normal. No respiratory distress.      Breath sounds: Normal breath sounds. No wheezing or rales.   Abdominal:      General: Bowel sounds are normal. There is no distension.      Palpations: Abdomen is soft.      Tenderness: There is abdominal tenderness. There is no  "guarding.      Comments: Epigastric slight tender   Musculoskeletal:         General: Normal range of motion.      Cervical back: Neck supple.      Right lower leg: No edema.      Left lower leg: No edema.   Skin:     General: Skin is warm and dry.      Comments: Abrasion to right toe   Neurological:      General: No focal deficit present.      Mental Status: He is alert and oriented to person, place, and time.      Comments: Tremors on hands   Psychiatric:         Mood and Affect: Mood normal.      Comments: Patient tearful during exam, emotional.          Lines/Drains:            Lab Results: I have reviewed the following results:  Results from last 7 days   Lab Units 10/25/24  1138   WBC Thousand/uL 8.14   HEMOGLOBIN g/dL 16.7   HEMATOCRIT % 45.8   PLATELETS Thousands/uL 82*   SEGS PCT % 77*   LYMPHO PCT % 14   MONO PCT % 6   EOS PCT % 1     Results from last 7 days   Lab Units 10/25/24  2050 10/25/24  1138   SODIUM mmol/L 128* 131*   POTASSIUM mmol/L 2.9* 3.0*   CHLORIDE mmol/L 90* 85*   CO2 mmol/L 20* 18*   BUN mg/dL 12 15   CREATININE mg/dL 0.76 0.92   ANION GAP mmol/L 18* 28*   CALCIUM mg/dL 7.9* 8.9   ALBUMIN g/dL  --  5.0   TOTAL BILIRUBIN mg/dL  --  1.46*   ALK PHOS U/L  --  85   ALT U/L  --  290*   AST U/L  --  313*   GLUCOSE RANDOM mg/dL 147* 135     Results from last 7 days   Lab Units 10/25/24  1220   INR  0.98         No results found for: \"HGBA1C\"  Results from last 7 days   Lab Units 10/25/24  1706 10/25/24  1352 10/25/24  1220   LACTIC ACID mmol/L 4.1* 5.7* 6.3*       Imaging Results Review: I reviewed radiology reports from this admission including: chest xray.  Other Study Results Review: EKG was reviewed.     Administrative Statements   I have spent a total time of 40 minutes in caring for this patient on the day of the visit/encounter including Counseling / Coordination of care, Documenting in the medical record, Reviewing / ordering tests, medicine, procedures  , Obtaining or reviewing history "  , and Communicating with other healthcare professionals .    ** Please Note: This note has been constructed using a voice recognition system. **

## 2024-10-26 NOTE — ASSESSMENT & PLAN NOTE
"Patient presents with not feeling well, tremors, nausea dry heaving, not eating for 3 days.  Per ED note, family called EMS as they did not hear from him for several days. Patient reports he relapsed on drinking on Saturday last week after being sober for 6 months.  Was drinking 1/5th vodka daily for 4 days in a row, last drink was on Tuesday 3 days ago.  Patient reports he was only drinking liquids, no solid food for 3 days. Patient reports history of alcohol withdrawal required ICU admission.Has been to alcohol rehab in the past.  According to ED triage note, \"Patient was recently discharged from rehab.  Patient reports drinking 3 to 4 days a week and about 1/5th of Vodka each day. Last drink around 7 AM on day of admission\".  Alcohol level 190  AG 28, bicarb 18, acetone 4.75, initial lactic acid 6.3.  Indicating alcoholic ketoacidosis.  Management as below.  CIWA protocol  Continue Librium 25 mg p.o. every 6 hours  Monitor electrolytes, replete as indicated  IV Pepcid for GI prophylaxis  Alcohol cessation  Discussed alcohol rehabilitation with the patient which patient refused at the current time right upper quadrant ultrasound showed hepatic steatosis with gallbladder discharge without any acute cholecystitis      "

## 2024-10-26 NOTE — PROGRESS NOTES
"Progress Note - Hospitalist   Name: Tres Deutsch 44 y.o. male I MRN: [id number]  Unit/Bed#: [address] [place] [address] Date of Admission: 10/25/2024   Date of Service: 10/26/2024 I Hospital Day: 1    Assessment & Plan  Alcohol withdrawal (HCC)  Patient presents with not feeling well, tremors, nausea dry heaving, not eating for 3 days.  Per ED note, family called EMS as they did not hear from him for several days. Patient reports he relapsed on drinking on Saturday last week after being sober for 6 months.  Was drinking 1/5th vodka daily for 4 days in a row, last drink was on Tuesday 3 days ago.  Patient reports he was only drinking liquids, no solid food for 3 days. Patient reports history of alcohol withdrawal required ICU admission.Has been to alcohol rehab in the past.  According to ED triage note, \"Patient was recently discharged from rehab.  Patient reports drinking 3 to 4 days a week and about 1/5th of Vodka each day. Last drink around 7 AM on day of admission\".  Alcohol level 190  AG 28, bicarb 18, acetone 4.75, initial lactic acid 6.3.  Indicating alcoholic ketoacidosis.  Management as below.  CIWA protocol  Continue Librium 25 mg p.o. every 6 hours  Monitor electrolytes, replete as indicated  IV Pepcid for GI prophylaxis  Alcohol cessation  Discussed alcohol rehabilitation with the patient which patient refused at the current time right upper quadrant ultrasound showed hepatic steatosis with gallbladder discharge without any acute cholecystitis      Alcoholic ketoacidosis  Given IV thiamine and D5 normal saline infusion in ED.  Patient has been on D5 NS at 150 mL/h which was changed to NS at 75 mL/h due to hyponatremia  Lactic acid level normalized with IV hydration  Anion gap has closed and bicarbonate level is normal.  Elevated LFTs  , , TB 1.46.  Due to alcohol abuse  Reports epigastric pain since Saturday.  Trend LFTs  Alcohol cessation  Right upper quadrant ultrasound showed gallbladder " sludge without acute cholecystitis and hepatic steatosis  Thrombocytopenia (HCC)  Platelet count 82-48.  Drop in platelet count likely secondary dilution from IV fluids patient has been on D5 NS at 150 mL/h with persistent hyponatremia  Due to alcohol use.  Hyponatremia  Sodium 131.  Suspect due to low solute.  Received NS 1000 mL and started on D5 NS in ED.    Patient has been on D5 NS at 150 mL/h with persistent hyponatremia  Will change IV fluids to NS at 75 mL/h  Results from last 7 days   Lab Units 10/26/24  1054 10/26/24  0516 10/26/24  0021 10/25/24  2050 10/25/24  1138   SODIUM mmol/L 130* 129*  129* 128* 128* 131*      Epigastric pain  Reports epigastric pain since Saturday.  Area is slight tender.  EKG showed NSR, possible inferior infarct.  Troponin was 321-6-193  Telemetry was unremarkable  IV Pepcid twice daily  2D echo showed EF of 60% without any significant wall abnormal abnormalities  Elevated troponin  Troponin trend was 321-6- 193  Patient denies any chest pain  EKG was unremarkable.  2D echo showed normal EF with a without any wall motion normalities  Likely non-MI troponin elevation in the setting of alcoholic ketoacidosis  Electrolyte abnormality  Patient noted to have hypokalemia, hypophosphatemia and hypomagnesemia.  Patient received IV magnesium sulfate and potassium supplementation  Potassium level still low 2.8 and magnesium level was normal.  Repeat lab values in a.m.    VTE Pharmacologic Prophylaxis: VTE Score: 2 Low Risk (Score 0-2) - Encourage Ambulation.    Mobility:   Basic Mobility Inpatient Raw Score: 24  JH-HLM Goal: 8: Walk 250 feet or more  JH-HLM Achieved: 8: Walk 250 feet ot more  JH-HLM Goal achieved. Continue to encourage appropriate mobility.    Patient Centered Rounds: I performed bedside rounds with nursing staff today.   Discussions with Specialists or Other Care Team Provider: No    Education and Discussions with Family / Patient: Updated  (father) via  phone.    Current Length of Stay: 1 day(s)  Current Patient Status: Inpatient   Certification Statement: The patient will continue to require additional inpatient hospital stay due to alcohol withdrawal and electrolyte abnormalities  Discharge Plan: Anticipate discharge in 24-48 hrs to home.    Code Status: Level 1 - Full Code    Subjective   Patient is feeling much better.  Denies any abdominal pain, chest pain or shortness of breath.  Minimally shaky.  Denies any headache or dizziness    Objective :  Temp:  [97.7 °F (36.5 °C)-98.7 °F (37.1 °C)] 97.7 °F (36.5 °C)  HR:  [64-95] 65  BP: (135-165)/(71-88) 135/76  Resp:  [13-19] 19  SpO2:  [94 %-98 %] 97 %  O2 Device: None (Room air)    Body mass index is 26.17 kg/m².     Input and Output Summary (last 24 hours):     Intake/Output Summary (Last 24 hours) at 10/26/2024 1245  Last data filed at 10/26/2024 0730  Gross per 24 hour   Intake --   Output 650 ml   Net -650 ml       Physical Exam  Constitutional:       Appearance: Normal appearance.   HENT:      Head: Normocephalic and atraumatic.   Eyes:      Extraocular Movements: Extraocular movements intact.      Pupils: Pupils are equal, round, and reactive to light.   Cardiovascular:      Rate and Rhythm: Normal rate and regular rhythm.      Heart sounds: No murmur heard.     No gallop.   Pulmonary:      Effort: Pulmonary effort is normal.      Breath sounds: Normal breath sounds.   Abdominal:      General: Bowel sounds are normal.      Palpations: Abdomen is soft.      Tenderness: There is no abdominal tenderness.   Musculoskeletal:         General: No swelling or deformity. Normal range of motion.      Cervical back: Normal range of motion and neck supple.   Skin:     General: Skin is warm and dry.   Neurological:      General: No focal deficit present.      Mental Status: He is alert.      Comments: Minimal shakiness of hands           Lines/Drains:        Telemetry:  Telemetry Orders (From admission, onward)                24 Hour Telemetry Monitoring  Continuous x 24 Hours (Telem)        Question:  Reason for 24 Hour Telemetry  Answer:  Arrhythmias requiring acute medical intervention / PPM or ICD malfunction                     Telemetry Reviewed: Normal Sinus Rhythm  Indication for Continued Telemetry Use: Metabolic/electrolyte disturbance with high probability of dysrhythmia               Lab Results: I have reviewed the following results:   Results from last 7 days   Lab Units 10/26/24  0516 10/25/24  1138   WBC Thousand/uL 7.39 8.14   HEMOGLOBIN g/dL 13.1 16.7   HEMATOCRIT % 35.4* 45.8   PLATELETS Thousands/uL 48* 82*   SEGS PCT %  --  77*   LYMPHO PCT %  --  14   MONO PCT %  --  6   EOS PCT %  --  1     Results from last 7 days   Lab Units 10/26/24  1054 10/26/24  0516   SODIUM mmol/L 130* 129*  129*   POTASSIUM mmol/L 2.8* 3.0*  3.0*   CHLORIDE mmol/L 94* 93*  93*   CO2 mmol/L 27 25  25   BUN mg/dL 9 10  10   CREATININE mg/dL 0.72 0.80  0.78   ANION GAP mmol/L 9 11  11   CALCIUM mg/dL 7.5* 7.7*  7.7*   ALBUMIN g/dL  --  3.8   TOTAL BILIRUBIN mg/dL  --  1.42*   ALK PHOS U/L  --  55   ALT U/L  --  189*   AST U/L  --  212*   GLUCOSE RANDOM mg/dL 145* 174*  174*     Results from last 7 days   Lab Units 10/25/24  1220   INR  0.98             Results from last 7 days   Lab Units 10/26/24  0238 10/26/24  0021 10/25/24  1706 10/25/24  1352   LACTIC ACID mmol/L 1.5 2.9* 4.1* 5.7*       Recent Cultures (last 7 days):         Imaging Results Review: I reviewed radiology reports from this admission including: chest xray and Ultrasound(s).  Other Study Results Review: EKG was reviewed.     Last 24 Hours Medication List:     Current Facility-Administered Medications:     bacitracin topical ointment 1 small application, Daily    chlordiazePOXIDE (LIBRIUM) capsule 25 mg, Q6H JIMMY    Famotidine (PF) (PEPCID) injection 20 mg, Q12H JIMMY    folic acid (FOLVITE) tablet 1 mg, Daily    labetalol (NORMODYNE) injection 10 mg, Q6H PRN     multivitamin-minerals (CENTRUM) tablet 1 tablet, Daily    ondansetron (ZOFRAN) injection 4 mg, Q6H PRN    potassium chloride (Klor-Con M20) CR tablet 40 mEq, Once    potassium chloride 20 mEq IVPB (premix), Once, Last Rate: 20 mEq (10/26/24 1236) **FOLLOWED BY** potassium chloride 20 mEq IVPB (premix), Once    sodium chloride 0.9 % infusion, Continuous, Last Rate: 75 mL/hr (10/26/24 0915)    thiamine tablet 100 mg, Daily    Administrative Statements   Today, Patient Was Seen By: Narciso Conte MD      **Please Note: This note may have been constructed using a voice recognition system.**

## 2024-10-26 NOTE — ASSESSMENT & PLAN NOTE
Troponin trend was 321-6- 193  Patient denies any chest pain  EKG was unremarkable.  2D echo showed normal EF with a without any wall motion normalities  Likely non-MI troponin elevation in the setting of alcoholic ketoacidosis

## 2024-10-26 NOTE — ASSESSMENT & PLAN NOTE
Given IV thiamine and D5 normal saline infusion in ED.  Patient has been on D5 NS at 150 mL/h which was changed to NS at 75 mL/h due to hyponatremia  Lactic acid level normalized with IV hydration  Anion gap has closed and bicarbonate level is normal.

## 2024-10-26 NOTE — ASSESSMENT & PLAN NOTE
Reports epigastric pain since Saturday.  Area is slight tender.  EKG showed NSR, possible inferior infarct.  Check troponin  Telemetry  IV Pepcid twice daily  Monitor for pain

## 2024-10-26 NOTE — UTILIZATION REVIEW
Initial Clinical Review    Admission: Date/Time/Statement:   Admission Orders (From admission, onward)       Ordered        10/25/24 1946  Inpatient Admission  Once                          Orders Placed This Encounter   Procedures    Inpatient Admission     Standing Status:   Standing     Number of Occurrences:   1     Order Specific Question:   Level of Care     Answer:   Med Surg [16]     Order Specific Question:   Estimated length of stay     Answer:   More than 2 Midnights     Order Specific Question:   Certification     Answer:   I certify that inpatient services are medically necessary for this patient for a duration of greater than two midnights. See H&P and MD Progress Notes for additional information about the patient's course of treatment.     ED Arrival Information       Patient not seen in ED                       No chief complaint on file.      Initial Presentation: 44 y.o. male ***    Anticipated Length of Stay/Certification Statement: ***    Date: ***   Day 2: ***    ED Treatment-Medication Administration - No Administrations Displayed (No Start Event Found)       None            Scheduled Medications:  bacitracin, 1 small application, Topical, Daily  chlordiazePOXIDE, 25 mg, Oral, Q6H JIMMY  famotidine, 20 mg, Intravenous, Q12H JIMMY  folic acid, 1 mg, Oral, Daily  multivitamin-minerals, 1 tablet, Oral, Daily  thiamine, 100 mg, Oral, Daily      Continuous IV Infusions:  dextrose 5 % and sodium chloride 0.9 %, 150 mL/hr, Intravenous, Continuous      PRN Meds:  labetalol, 10 mg, Intravenous, Q6H PRN  ondansetron, 4 mg, Intravenous, Q6H PRN      ED Triage Vitals   Temperature Pulse Respirations Blood Pressure SpO2 Pain Score   10/25/24 1919 10/25/24 1919 10/25/24 1919 10/25/24 1919 10/25/24 1919 10/25/24 2015   98.7 °F (37.1 °C) 90 18 162/88 97 % 3     Weight (last 2 days)       Date/Time Weight    10/25/24 20:15:47 97.5 (215)            Vital Signs (last 3 days)       Date/Time Temp Pulse Resp BP MAP  (mmHg) SpO2 Agusítn Coma Scale Score CIWA-Ar Total Pain    10/26/24 07:17:48 97.7 °F (36.5 °C) 70 17 139/74 96 97 % -- -- --    10/26/24 05:18:14 -- 87 18 139/71 94 95 % -- -- --    10/26/24 0400 -- -- -- 139/71 -- -- -- 4 --    10/26/24 02:49:32 98.5 °F (36.9 °C) 83 16 138/71 93 95 % -- -- --    10/26/24 0000 -- -- -- 138/79 -- -- -- 4 --    10/25/24 22:53:39 -- 86 18 138/76 97 97 % -- -- --    10/25/24 20:15:47 98.1 °F (36.7 °C) 90 18 160/85 110 94 % 15 -- 3    10/25/24 2000 -- -- -- 160/85 -- -- -- 7 --    10/25/24 19:19:59 98.7 °F (37.1 °C) 90 18 162/88 113 97 % -- -- --           CIWA-Ar Score       Row Name 10/26/24 0400 10/26/24 0000 10/25/24 2000       CIWA-Ar    /71 138/79 160/85    Nausea and Vomiting 0 0 1    Tactile Disturbances 0 0 0    Tremor 3 3 3    Auditory Disturbances 0 0 0    Paroxysmal Sweats 1 1 1    Visual Disturbances 0 0 0    Anxiety 0 0 0    Headache, Fullness in Head 0 0 2    Agitation 0 0 0    Orientation and Clouding of Sensorium 0 0 0    CIWA-Ar Total 4 4 7                    Pertinent Labs/Diagnostic Test Results:   Radiology:  US abdomen limited    (Results Pending)     Cardiology:  ECG 12 lead    by Interface, Ris Results In (10/26 0142)      ECG 12 lead    by Interface, Ris Results In (10/26 0138)        GI:  No orders to display           Results from last 7 days   Lab Units 10/26/24  0516 10/25/24  1138   WBC Thousand/uL 7.39 8.14   HEMOGLOBIN g/dL 13.1 16.7   HEMATOCRIT % 35.4* 45.8   PLATELETS Thousands/uL 48* 82*   TOTAL NEUT ABS Thousands/µL  --  6.34         Results from last 7 days   Lab Units 10/26/24  0516 10/26/24  0021 10/25/24  2050 10/25/24  1138   SODIUM mmol/L 129*  129* 128* 128* 131*   POTASSIUM mmol/L 3.0*  3.0* 3.6 2.9* 3.0*   CHLORIDE mmol/L 93*  93* 92* 90* 85*   CO2 mmol/L 25  25 20* 20* 18*   ANION GAP mmol/L 11  11 16* 18* 28*   BUN mg/dL 10  10 12 12 15   CREATININE mg/dL 0.80  0.78 0.75 0.76 0.92   EGFR ml/min/1.73sq m 110  111 113 112 102    CALCIUM mg/dL 7.7*  7.7* 8.1* 7.9* 8.9   MAGNESIUM mg/dL 1.9  --  1.5* 2.3   PHOSPHORUS mg/dL  --   --  1.1*  --      Results from last 7 days   Lab Units 10/26/24  0516 10/25/24  1138   AST U/L 212* 313*   ALT U/L 189* 290*   ALK PHOS U/L 55 85   TOTAL PROTEIN g/dL 5.6* 7.5   ALBUMIN g/dL 3.8 5.0   TOTAL BILIRUBIN mg/dL 1.42* 1.46*         Results from last 7 days   Lab Units 10/26/24  0516 10/26/24  0021 10/25/24  2050 10/25/24  1138   GLUCOSE RANDOM mg/dL 174*  174* 123 147* 135             Beta- Hydroxybutyrate   Date Value Ref Range Status   10/26/2024 2.01 (H) 0.02 - 0.27 mmol/L Final   10/25/2024 4.75 (H) 0.02 - 0.27 mmol/L Final          Results from last 7 days   Lab Units 10/25/24  1245   PH CARLOS ALBERTO  7.405*   PCO2 CARLOS ALBERTO mm Hg 29.9*   PO2 CARLOS ALBERTO mm Hg 92.5*   HCO3 CARLOS ALBERTO mmol/L 18.3*   BASE EXC CARLOS ALBERTO mmol/L -4.9   O2 CONTENT CARLOS ALBERTO ml/dL 21.1   O2 HGB, VENOUS % 94.7*             Results from last 7 days   Lab Units 10/26/24  0516 10/26/24  0238 10/26/24  0021   HS TNI 0HR ng/L  --   --  321*   HS TNI 2HR ng/L  --  6  --    HSTNI D2 ng/L  --  -315  --    HS TNI 4HR ng/L 193*  --   --    HSTNI D4 ng/L -128  --   --          Results from last 7 days   Lab Units 10/25/24  1220   PROTIME seconds 13.4   INR  0.98   PTT seconds 25     Results from last 7 days   Lab Units 10/26/24  0516   TSH 3RD GENERATON uIU/mL 3.143         Results from last 7 days   Lab Units 10/26/24  0238 10/26/24  0021 10/25/24  1706 10/25/24  1352 10/25/24  1220   LACTIC ACID mmol/L 1.5 2.9* 4.1* 5.7* 6.3*                                 Results from last 7 days   Lab Units 10/25/24  1138   LIPASE u/L 54                 Results from last 7 days   Lab Units 10/25/24  1327   CLARITY UA  Clear   COLOR UA  Kia*   SPEC GRAV UA  >=1.030   PH UA  6.0   GLUCOSE UA mg/dl Negative   KETONES UA mg/dl 80 (3+)*   BLOOD UA  3+*   PROTEIN UA mg/dl 2+*   NITRITE UA  Negative   BILIRUBIN UA  1+*   UROBILINOGEN UA E.U./dl 0.2   LEUKOCYTES UA  Negative   WBC UA /hpf  None Seen   RBC UA /hpf 1-2   BACTERIA UA /hpf Occasional   EPITHELIAL CELLS WET PREP /hpf Occasional   SODIUM UR  15                 Results from last 7 days   Lab Units 10/25/24  1706   ETHANOL LVL mg/dL 190*                                   Past Medical History:   Diagnosis Date    Alcohol abuse      Present on Admission:  **None**      Admitting Diagnosis: Alcoholic ketoacidosis [E87.29]  Age/Sex: 44 y.o. male    Network Utilization Review Department  ATTENTION: Please call with any questions or concerns to [phone] and carefully listen to the prompts so that you are directed to the right person. All voicemails are confidential.   For Discharge needs, contact Care Management DC Support Team at [phone] opt. 2  Send all requests for admission clinical reviews, approved or denied determinations and any other requests to dedicated fax number below belonging to the campus where the patient is receiving treatment. List of dedicated fax numbers for the Facilities:  FACILITY NAME UR FAX NUMBER   ADMISSION DENIALS (Administrative/Medical Necessity) [phone]   DISCHARGE SUPPORT TEAM (NETWORK) [phone]   PARENT CHILD HEALTH (Maternity/NICU/Pediatrics) [phone]   [facility] [phone]   [facility] [phone]   [facility] [phone]   [facility] [phone]   [facility] [phone]   [facility] [phone]   [facility] [phone]   [facility] [phone]   [facility] [phone]   [facility] [phone]   [facility] [phone]   [facility] [phone]

## 2024-10-26 NOTE — ASSESSMENT & PLAN NOTE
, , TB 1.46.  Due to alcohol abuse  Reports epigastric pain since Saturday.  Trend LFTs  Alcohol cessation

## 2024-10-26 NOTE — ASSESSMENT & PLAN NOTE
Reports epigastric pain since Saturday.  Area is slight tender.  EKG showed NSR, possible inferior infarct.  Troponin was 321-6-193  Telemetry was unremarkable  IV Pepcid twice daily  2D echo showed EF of 60% without any significant wall abnormal abnormalities

## 2024-10-27 VITALS
BODY MASS INDEX: 26.18 KG/M2 | OXYGEN SATURATION: 97 % | RESPIRATION RATE: 18 BRPM | HEART RATE: 86 BPM | TEMPERATURE: 98 F | WEIGHT: 215 LBS | SYSTOLIC BLOOD PRESSURE: 133 MMHG | DIASTOLIC BLOOD PRESSURE: 84 MMHG | HEIGHT: 76 IN

## 2024-10-27 LAB
ALBUMIN SERPL BCG-MCNC: 3.9 G/DL (ref 3.5–5)
ALP SERPL-CCNC: 56 U/L (ref 34–104)
ALT SERPL W P-5'-P-CCNC: 178 U/L (ref 7–52)
ANION GAP SERPL CALCULATED.3IONS-SCNC: 6 MMOL/L (ref 4–13)
ANION GAP SERPL CALCULATED.3IONS-SCNC: 7 MMOL/L (ref 4–13)
AST SERPL W P-5'-P-CCNC: 171 U/L (ref 13–39)
BILIRUB SERPL-MCNC: 1.73 MG/DL (ref 0.2–1)
BUN SERPL-MCNC: 6 MG/DL (ref 5–25)
BUN SERPL-MCNC: 9 MG/DL (ref 5–25)
CALCIUM SERPL-MCNC: 8.7 MG/DL (ref 8.4–10.2)
CALCIUM SERPL-MCNC: 9.1 MG/DL (ref 8.4–10.2)
CHLORIDE SERPL-SCNC: 101 MMOL/L (ref 96–108)
CHLORIDE SERPL-SCNC: 99 MMOL/L (ref 96–108)
CO2 SERPL-SCNC: 30 MMOL/L (ref 21–32)
CO2 SERPL-SCNC: 31 MMOL/L (ref 21–32)
CREAT SERPL-MCNC: 0.77 MG/DL (ref 0.6–1.3)
CREAT SERPL-MCNC: 0.77 MG/DL (ref 0.6–1.3)
ERYTHROCYTE [DISTWIDTH] IN BLOOD BY AUTOMATED COUNT: 12.1 % (ref 11.6–15.1)
GFR SERPL CREATININE-BSD FRML MDRD: 111 ML/MIN/1.73SQ M
GFR SERPL CREATININE-BSD FRML MDRD: 111 ML/MIN/1.73SQ M
GLUCOSE SERPL-MCNC: 104 MG/DL (ref 65–140)
GLUCOSE SERPL-MCNC: 117 MG/DL (ref 65–140)
HCT VFR BLD AUTO: 40.4 % (ref 36.5–49.3)
HGB BLD-MCNC: 14.6 G/DL (ref 12–17)
MAGNESIUM SERPL-MCNC: 2 MG/DL (ref 1.9–2.7)
MCH RBC QN AUTO: 31.7 PG (ref 26.8–34.3)
MCHC RBC AUTO-ENTMCNC: 36.1 G/DL (ref 31.4–37.4)
MCV RBC AUTO: 88 FL (ref 82–98)
PHOSPHATE SERPL-MCNC: 1.2 MG/DL (ref 2.7–4.5)
PHOSPHATE SERPL-MCNC: 2.6 MG/DL (ref 2.7–4.5)
PLATELET # BLD AUTO: 43 THOUSANDS/UL (ref 149–390)
PMV BLD AUTO: 10.9 FL (ref 8.9–12.7)
POTASSIUM SERPL-SCNC: 3 MMOL/L (ref 3.5–5.3)
POTASSIUM SERPL-SCNC: 3.8 MMOL/L (ref 3.5–5.3)
PROT SERPL-MCNC: 6 G/DL (ref 6.4–8.4)
RBC # BLD AUTO: 4.61 MILLION/UL (ref 3.88–5.62)
SODIUM SERPL-SCNC: 135 MMOL/L (ref 135–147)
SODIUM SERPL-SCNC: 139 MMOL/L (ref 135–147)
WBC # BLD AUTO: 4.7 THOUSAND/UL (ref 4.31–10.16)

## 2024-10-27 PROCEDURE — 85027 COMPLETE CBC AUTOMATED: CPT | Performed by: INTERNAL MEDICINE

## 2024-10-27 PROCEDURE — 84100 ASSAY OF PHOSPHORUS: CPT | Performed by: INTERNAL MEDICINE

## 2024-10-27 PROCEDURE — 83735 ASSAY OF MAGNESIUM: CPT | Performed by: INTERNAL MEDICINE

## 2024-10-27 PROCEDURE — 99239 HOSP IP/OBS DSCHRG MGMT >30: CPT | Performed by: INTERNAL MEDICINE

## 2024-10-27 PROCEDURE — 80048 BASIC METABOLIC PNL TOTAL CA: CPT | Performed by: INTERNAL MEDICINE

## 2024-10-27 PROCEDURE — 80053 COMPREHEN METABOLIC PANEL: CPT | Performed by: INTERNAL MEDICINE

## 2024-10-27 RX ORDER — MULTIVITAMIN
1 CAPSULE ORAL DAILY
Qty: 30 CAPSULE | Refills: 0 | Status: SHIPPED | OUTPATIENT
Start: 2024-10-27

## 2024-10-27 RX ORDER — FAMOTIDINE 20 MG/1
20 TABLET, FILM COATED ORAL 2 TIMES DAILY
Qty: 60 TABLET | Refills: 0 | Status: SHIPPED | OUTPATIENT
Start: 2024-10-27

## 2024-10-27 RX ORDER — POTASSIUM CHLORIDE 1500 MG/1
40 TABLET, EXTENDED RELEASE ORAL ONCE
Status: COMPLETED | OUTPATIENT
Start: 2024-10-27 | End: 2024-10-27

## 2024-10-27 RX ADMIN — FAMOTIDINE 20 MG: 10 INJECTION, SOLUTION INTRAVENOUS at 08:36

## 2024-10-27 RX ADMIN — POTASSIUM CHLORIDE 40 MEQ: 1500 TABLET, EXTENDED RELEASE ORAL at 10:07

## 2024-10-27 RX ADMIN — POTASSIUM PHOSPHATE, MONOBASIC POTASSIUM PHOSPHATE, DIBASIC 30 MMOL: 224; 236 INJECTION, SOLUTION, CONCENTRATE INTRAVENOUS at 10:06

## 2024-10-27 RX ADMIN — CHLORDIAZEPOXIDE HYDROCHLORIDE 25 MG: 25 CAPSULE ORAL at 05:48

## 2024-10-27 RX ADMIN — THIAMINE HCL TAB 100 MG 100 MG: 100 TAB at 08:36

## 2024-10-27 RX ADMIN — Medication 1 TABLET: at 08:36

## 2024-10-27 RX ADMIN — POTASSIUM CHLORIDE 40 MEQ: 1500 TABLET, EXTENDED RELEASE ORAL at 14:02

## 2024-10-27 RX ADMIN — CHLORDIAZEPOXIDE HYDROCHLORIDE 25 MG: 25 CAPSULE ORAL at 12:08

## 2024-10-27 RX ADMIN — BACITRACIN ZINC 1 SMALL APPLICATION: 500 OINTMENT TOPICAL at 08:36

## 2024-10-27 RX ADMIN — FOLIC ACID 1 MG: 1 TABLET ORAL at 08:36

## 2024-10-27 NOTE — ASSESSMENT & PLAN NOTE
Patient noted to have hypokalemia, hypophosphatemia and hypomagnesemia.  Patient received aggressive IV magnesium and potassium supplementation  Patient also noted hyponatremia which normalized.  Electrolytes were aggressively repleted and electrolytes were normal before discharge

## 2024-10-27 NOTE — ASSESSMENT & PLAN NOTE
Platelet count 82-48-43k.  Drop in platelet count likely secondary dilution from IV fluids patient has been on D5 NS at 150 mL/h with persistent hyponatremia  Due to alcohol use.

## 2024-10-27 NOTE — NURSING NOTE
AVS reviewed with pt. IV removed. Pt left with belongings. Pt took Lyft ride home and insisted on waiting in the lobby. Pt receiving Lyft ride updates

## 2024-10-27 NOTE — ASSESSMENT & PLAN NOTE
, , TB 1.46.  Due to alcohol abuse  Reports epigastric pain since Saturday.  LFTs are improving  Alcohol cessation  Right upper quadrant ultrasound showed gallbladder sludge without acute cholecystitis and hepatic steatosis

## 2024-10-27 NOTE — UTILIZATION REVIEW
NOTIFICATION OF INPATIENT ADMISSION   AUTHORIZATION REQUEST   SERVICING FACILITY:   [place] [address]  Tax ID: [id number]  NPI: [id number] ATTENDING PROVIDER:  Attending Name and NPI#: Narciso Conte Md [[id number]]  Address: [address] [place] [address]  Phone: [phone]   ADMISSION INFORMATION:  Place of Service: Inpatient [facility]  Place of Service Code: 21  Inpatient Admission Date/Time: 10/25/24  7:14 PM  Discharge Date/Time: No discharge date for patient encounter.  Admitting Diagnosis Code/Description:  Alcoholic ketoacidosis [E87.29]     UTILIZATION REVIEW CONTACT:  Susan Milligan Utilization   Network Utilization Review Department  Phone: [phone]  Fax [phone]  Email: [email or web address]  Contact for approvals/pending authorizations, clinical reviews, and discharge.     PHYSICIAN ADVISORY SERVICES:  Medical Necessity Denial & Kcpw-xn-Xaea Review  Phone: [phone]  Fax: [phone]  Email: [email or web address]     DISCHARGE SUPPORT TEAM:  For Patients Discharge Needs & Updates  Phone: [phone] opt. 2 Fax: [phone]  Email: [email or web address]

## 2024-10-27 NOTE — ASSESSMENT & PLAN NOTE
Reports epigastric pain since Saturday.  Area is slight tender.  EKG showed NSR, possible inferior infarct.  Troponin was 321-6-193  Telemetry was unremarkable  Continue PPI upon discharge  2D echo showed EF of 60% without any significant wall abnormal abnormalities

## 2024-10-27 NOTE — PLAN OF CARE
Problem: PAIN - ADULT  Goal: Verbalizes/displays adequate comfort level or baseline comfort level  Description: Interventions:  - Encourage patient to monitor pain and request assistance  - Assess pain using appropriate pain scale  - Administer analgesics based on type and severity of pain and evaluate response  - Implement non-pharmacological measures as appropriate and evaluate response  - Consider cultural and social influences on pain and pain management  - Notify physician/advanced practitioner if interventions unsuccessful or patient reports new pain  Outcome: Progressing     Problem: INFECTION - ADULT  Goal: Absence or prevention of progression during hospitalization  Description: INTERVENTIONS:  - Assess and monitor for signs and symptoms of infection  - Monitor lab/diagnostic results  - Monitor all insertion sites, i.e. indwelling lines, tubes, and drains  - Monitor endotracheal if appropriate and nasal secretions for changes in amount and color  - [place] appropriate cooling/warming therapies per order  - Administer medications as ordered  - Instruct and encourage patient and family to use good hand hygiene technique  - Identify and instruct in appropriate isolation precautions for identified infection/condition  Outcome: Progressing  Goal: Absence of fever/infection during neutropenic period  Description: INTERVENTIONS:  - Monitor WBC    Outcome: Progressing     Problem: SAFETY ADULT  Goal: Patient will remain free of falls  Description: INTERVENTIONS:  - Educate patient/family on patient safety including physical limitations  - Instruct patient to call for assistance with activity   - Consult OT/PT to assist with strengthening/mobility   - Keep Call bell within reach  - Keep bed low and locked with side rails adjusted as appropriate  - Keep care items and personal belongings within reach  - Initiate and maintain comfort rounds  - Make Fall Risk Sign visible to staff  - Offer Toileting every 2 Hours,  in advance of need  - Initiate/Maintain bed alarm  - Obtain necessary fall risk management equipment: yellow socks  - Apply yellow socks and bracelet for high fall risk patients  - Consider moving patient to room near nurses station  Outcome: Progressing  Goal: Maintain or return to baseline ADL function  Description: INTERVENTIONS:  -  Assess patient's ability to carry out ADLs; assess patient's baseline for ADL function and identify physical deficits which impact ability to perform ADLs (bathing, care of mouth/teeth, toileting, grooming, dressing, etc.)  - Assess/evaluate cause of self-care deficits   - Assess range of motion  - Assess patient's mobility; develop plan if impaired  - Assess patient's need for assistive devices and provide as appropriate  - Encourage maximum independence but intervene and supervise when necessary  - Involve family in performance of ADLs  - Assess for home care needs following discharge   - Consider OT consult to assist with ADL evaluation and planning for discharge  - Provide patient education as appropriate  Outcome: Progressing  Goal: Maintains/Returns to pre admission functional level  Description: INTERVENTIONS:  - Perform AM-PAC 6 Click Basic Mobility/ Daily Activity assessment daily.  - Set and communicate daily mobility goal to care team and patient/family/caregiver.   - Collaborate with rehabilitation services on mobility goals if consulted  - Perform Range of Motion 3 times a day.  - Reposition patient every 2 hours.  - Dangle patient 3 times a day  - Stand patient 3 times a day  - Ambulate patient 3 times a day  - Out of bed to chair 3 times a day   - Out of bed for meals 3 times a day  - Out of bed for toileting  - Record patient progress and toleration of activity level   Outcome: Progressing     Problem: DISCHARGE PLANNING  Goal: Discharge to home or other facility with appropriate resources  Description: INTERVENTIONS:  - Identify barriers to discharge w/patient and  caregiver  - Arrange for needed discharge resources and transportation as appropriate  - Identify discharge learning needs (meds, wound care, etc.)  - Arrange for interpretive services to assist at discharge as needed  - Refer to Case Management Department for coordinating discharge planning if the patient needs post-hospital services based on physician/advanced practitioner order or complex needs related to functional status, cognitive ability, or social support system  Outcome: Progressing     Problem: Knowledge Deficit  Goal: Patient/family/caregiver demonstrates understanding of disease process, treatment plan, medications, and discharge instructions  Description: Complete learning assessment and assess knowledge base.  Interventions:  - Provide teaching at level of understanding  - Provide teaching via preferred learning methods  Outcome: Progressing     Problem: METABOLIC, FLUID AND ELECTROLYTES - ADULT  Goal: Electrolytes maintained within normal limits  Description: INTERVENTIONS:  - Monitor labs and assess patient for signs and symptoms of electrolyte imbalances  - Administer electrolyte replacement as ordered  - Monitor response to electrolyte replacements, including repeat lab results as appropriate  - Instruct patient on fluid and nutrition as appropriate  Outcome: Progressing  Goal: Fluid balance maintained  Description: INTERVENTIONS:  - Monitor labs   - Monitor I/O and WT  - Instruct patient on fluid and nutrition as appropriate  - Assess for signs & symptoms of volume excess or deficit  Outcome: Progressing  Goal: Glucose maintained within target range  Description: INTERVENTIONS:  - Monitor Blood Glucose as ordered  - Assess for signs and symptoms of hyperglycemia and hypoglycemia  - Administer ordered medications to maintain glucose within target range  - Assess nutritional intake and initiate nutrition service referral as needed  Outcome: Progressing     Problem: Nutrition/Hydration-ADULT  Goal:  Nutrient/Hydration intake appropriate for improving, restoring or maintaining nutritional needs  Description: Monitor and assess patient's nutrition/hydration status for malnutrition. Collaborate with interdisciplinary team and initiate plan and interventions as ordered.  Monitor patient's weight and dietary intake as ordered or per policy. Utilize nutrition screening tool and intervene as necessary. Determine patient's food preferences and provide high-protein, high-caloric foods as appropriate.     INTERVENTIONS:  - Monitor oral intake, urinary output, labs, and treatment plans  - Assess nutrition and hydration status and recommend course of action  - Evaluate amount of meals eaten  - Assist patient with eating if necessary   - Allow adequate time for meals  - Recommend/ encourage appropriate diets, oral nutritional supplements, and vitamin/mineral supplements  - Order, calculate, and assess calorie counts as needed  - Recommend, monitor, and adjust tube feedings and TPN/PPN based on assessed needs  - Assess need for intravenous fluids  - Provide specific nutrition/hydration education as appropriate  - Include patient/family/caregiver in decisions related to nutrition  Outcome: Progressing

## 2024-10-27 NOTE — ASSESSMENT & PLAN NOTE
"Patient has been on Librium 25 mg p.o. every 6 hours which will be discontinued patient presents with not feeling well, tremors, nausea dry heaving, not eating for 3 days.  Per ED note, family called EMS as they did not hear from him for several days. Patient reports he relapsed on drinking on Saturday last week after being sober for 6 months.  Was drinking 1/5th vodka daily for 4 days in a row, last drink was on Tuesday 3 days ago.  Patient reports he was only drinking liquids, no solid food for 3 days. Patient reports history of alcohol withdrawal required ICU admission.Has been to alcohol rehab in the past.  According to ED triage note, \"Patient was recently discharged from rehab.  Patient reports drinking 3 to 4 days a week and about 1/5th of Vodka each day. Last drink around 7 AM on day of admission\".  Alcohol level 190  AG 28, bicarb 18, acetone 4.75, initial lactic acid 6.3.  Indicating alcoholic ketoacidosis.  Management as below.  CIWA protocol  Patient has been on Librium 25 mg p.o. every 6 hours which will be discontinued  Monitor electrolytes, replete as indicated  Patient was on IV Pepcid for GI prophylaxis  Alcohol cessation  Discussed alcohol rehabilitation with the patient which patient refused at the current time   right upper quadrant ultrasound showed hepatic steatosis with gallbladder discharge without any acute cholecystitis      "

## 2024-10-27 NOTE — DISCHARGE SUMMARY
"Discharge Summary - Hospitalist   Name: Tres Deutsch 44 y.o. male I MRN: [id number]  Unit/Bed#: [address] [place] [address] Date of Admission: 10/25/2024   Date of Service: 10/27/2024 I Hospital Day: 2     Assessment & Plan  Alcohol withdrawal (HCC)  Patient has been on Librium 25 mg p.o. every 6 hours which will be discontinued patient presents with not feeling well, tremors, nausea dry heaving, not eating for 3 days.  Per ED note, family called EMS as they did not hear from him for several days. Patient reports he relapsed on drinking on Saturday last week after being sober for 6 months.  Was drinking 1/5th vodka daily for 4 days in a row, last drink was on Tuesday 3 days ago.  Patient reports he was only drinking liquids, no solid food for 3 days. Patient reports history of alcohol withdrawal required ICU admission.Has been to alcohol rehab in the past.  According to ED triage note, \"Patient was recently discharged from rehab.  Patient reports drinking 3 to 4 days a week and about 1/5th of Vodka each day. Last drink around 7 AM on day of admission\".  Alcohol level 190  AG 28, bicarb 18, acetone 4.75, initial lactic acid 6.3.  Indicating alcoholic ketoacidosis.  Management as below.  CIWA protocol  Patient has been on Librium 25 mg p.o. every 6 hours which will be discontinued  Monitor electrolytes, replete as indicated  Patient was on IV Pepcid for GI prophylaxis  Alcohol cessation  Discussed alcohol rehabilitation with the patient which patient refused at the current time   right upper quadrant ultrasound showed hepatic steatosis with gallbladder discharge without any acute cholecystitis      Alcoholic ketoacidosis  Given IV thiamine and D5 normal saline infusion in ED.  Patient has been on D5 NS at 150 mL/h which was changed to NS at 75 mL/h due to hyponatremia  Lactic acid level normalized with IV hydration  Anion gap has closed and bicarbonate level is normal.  Elevated LFTs  , , TB 1.46.  Due to " alcohol abuse  Reports epigastric pain since Saturday.  LFTs are improving  Alcohol cessation  Right upper quadrant ultrasound showed gallbladder sludge without acute cholecystitis and hepatic steatosis  Thrombocytopenia (HCC)  Platelet count 82-48-43k.  Drop in platelet count likely secondary dilution from IV fluids patient has been on D5 NS at 150 mL/h with persistent hyponatremia  Due to alcohol use.  Hyponatremia  Sodium 131.  Suspect due to low solute.  Received NS 1000 mL and started on D5 NS in ED.    Patient has been on D5 normal saline which was changed to NS with resolution of hyponatremia  Results from last 7 days   Lab Units 10/27/24  0550 10/26/24  2004 10/26/24  1054 10/26/24  0516 10/26/24  0021 10/25/24  2050 10/25/24  1138   SODIUM mmol/L 139 131* 130* 129*  129* 128* 128* 131*      Epigastric pain  Reports epigastric pain since Saturday.  Area is slight tender.  EKG showed NSR, possible inferior infarct.  Troponin was 321-6-193  Telemetry was unremarkable  Continue PPI upon discharge  2D echo showed EF of 60% without any significant wall abnormal abnormalities  Elevated troponin  Troponin trend was 321-6- 193  Patient denies any chest pain  EKG was unremarkable.  2D echo showed normal EF with a without any wall motion normalities  Likely non-MI troponin elevation in the setting of alcoholic ketoacidosis  Electrolyte abnormality  Patient noted to have hypokalemia, hypophosphatemia and hypomagnesemia.  Patient received aggressive IV magnesium and potassium supplementation  Patient also noted hyponatremia which normalized.  Electrolytes were aggressively repleted and electrolytes were normal before discharge     Medical Problems      Discharging Physician / Practitioner: Narciso Conte MD  PCP: No primary care provider on file.  Admission Date:   Admission Orders (From admission, onward)       Ordered        10/25/24 1946  Inpatient Admission  Once                          Discharge Date:  "10/27/24    Consultations During Hospital Stay:  None      Significant Findings / Test Results:   Chest x-ray was unremarkable  Abdominal ultrasound with hepatic steatosis and gallbladder sludge without acute cholecystitis  2D echo showed EF of 65% with normal diastolic function and systolic function without any wall motion normalities       Outpatient Tests Requested:  Follow-up with PCP and consider alcohol rehabilitation as outpatient    Complications: None    Reason for Admission: Not feeling well with tremors nausea dry heaving and not eating for 3 days    Hospital Course:   Tres Deutsch is a 44 y.o. male patient with past medical history of alcohol abuse, alcohol withdrawal who originally presented to the hospital on 10/25/2024 due to tremors with nausea and dry heaving and poor oral intake for 3 days.  Patient sent to the hospital and noted to have alcohol withdrawal with alcoholic ketoacidosis and significant ocular abnormalities which all resolved with IV hydration and repletion of electrolytes.  Patient was also treated for withdrawal with CIWA protocol and low-dose Librium.  Patient continued remained stable.  Alcohol rehabilitation was offered to the patient which patient refused at the current time.  Patient also not have mild elevation of troponin but echo was normal.  Patient acutely remained stable and was discharged home with an outpatient follow-up.      Please see above list of diagnoses and related plan for additional information.     Condition at Discharge: fair    Discharge Day Visit / Exam:   Subjective: Patient is feeling well and getting bored and anxious to go home.  Vitals: Blood Pressure: 132/84 (10/27/24 0751)  Pulse: 69 (10/27/24 0751)  Temperature: 98.4 °F (36.9 °C) (10/26/24 1847)  Temp Source: Oral (10/25/24 2015)  Respirations: 19 (10/26/24 1154)  Height: 6' 4\" (193 cm) (10/26/24 1022)  Weight - Scale: 97.5 kg (215 lb) (10/26/24 1022)  SpO2: 96 % (10/27/24 0751)  Physical " Exam  Constitutional:       Appearance: Normal appearance.   HENT:      Head: Normocephalic and atraumatic.   Eyes:      Extraocular Movements: Extraocular movements intact.      Pupils: Pupils are equal, round, and reactive to light.   Cardiovascular:      Rate and Rhythm: Normal rate and regular rhythm.      Heart sounds: No murmur heard.     No gallop.   Pulmonary:      Effort: Pulmonary effort is normal.      Breath sounds: Normal breath sounds.   Abdominal:      General: Bowel sounds are normal.      Palpations: Abdomen is soft.      Tenderness: There is no abdominal tenderness.   Musculoskeletal:         General: No swelling or deformity. Normal range of motion.      Cervical back: Normal range of motion and neck supple.   Skin:     General: Skin is warm and dry.   Neurological:      General: No focal deficit present.      Mental Status: He is alert.      Comments: No tremors         Discharge instructions/Information to patient and family:   See after visit summary for information provided to patient and family.      Provisions for Follow-Up Care:  See after visit summary for information related to follow-up care and any pertinent home health orders.      Mobility at time of Discharge:   Basic Mobility Inpatient Raw Score: 24  JH-HLM Goal: 8: Walk 250 feet or more  JH-HLM Achieved: 8: Walk 250 feet ot more  HLM Goal achieved. Continue to encourage appropriate mobility.     Disposition:   Home    Planned Readmission: No    Discharge Medications:  See after visit summary for reconciled discharge medications provided to patient and/or family.      Administrative Statements   Discharge Statement:  I have spent a total time of 35 minutes in caring for this patient on the day of the visit/encounter. .    **Please Note: This note may have been constructed using a voice recognition system**

## 2024-10-27 NOTE — ASSESSMENT & PLAN NOTE
Sodium 131.  Suspect due to low solute.  Received NS 1000 mL and started on D5 NS in ED.    Patient has been on D5 normal saline which was changed to NS with resolution of hyponatremia  Results from last 7 days   Lab Units 10/27/24  0550 10/26/24  2004 10/26/24  1054 10/26/24  0516 10/26/24  0021 10/25/24  2050 10/25/24  1138   SODIUM mmol/L 139 131* 130* 129*  129* 128* 128* 131*

## 2024-10-27 NOTE — UTILIZATION REVIEW
Initial Clinical Review    Admission: Date/Time/Statement:   Admission Orders (From admission, onward)       Ordered        10/25/24 1946  Inpatient Admission  Once                          Orders Placed This Encounter   Procedures    Inpatient Admission     Standing Status:   Standing     Number of Occurrences:   1     Order Specific Question:   Level of Care     Answer:   Med Surg [16]     Order Specific Question:   Estimated length of stay     Answer:   More than 2 Midnights     Order Specific Question:   Certification     Answer:   I certify that inpatient services are medically necessary for this patient for a duration of greater than two midnights. See H&P and MD Progress Notes for additional information about the patient's course of treatment.     ED Arrival Information       Patient not seen in [facility] ED - patient presented to the [facility] ED on 10/25/24, transferred to the [facility] for admission.                     [place] ED Medication Administration to 10/25/2024 1858:     Date/Time Order Dose Route Action    10/25/2024 1341 EDT thiamine (VITAMIN B1) 100 mg in sodium chloride 0.9 % 50 mL IVPB 0 mg Intravenous Stopped    10/25/2024 1245 EDT thiamine (VITAMIN B1) 100 mg in sodium chloride 0.9 % 50 mL IVPB 100 mg Intravenous New Bag    10/25/2024 1505 EDT folic acid 1 mg in sodium chloride 0.9 % 50 mL IVPB 0 mg Intravenous Stopped    10/25/2024 1353 EDT folic acid 1 mg in sodium chloride 0.9 % 50 mL IVPB 1 mg Intravenous New Bag    10/25/2024 1341 EDT sodium chloride 0.9 % bolus 1,000 mL 0 mL Intravenous Stopped    10/25/2024 1224 EDT sodium chloride 0.9 % bolus 1,000 mL 1,000 mL Intravenous New Bag    10/25/2024 1226 EDT potassium chloride (Klor-Con M20) CR tablet 40 mEq 40 mEq Oral Given    10/25/2024 1513 EDT dextrose 5 % and sodium chloride 0.9 % infusion 200 mL/hr Intravenous New Bag         Initial Presentation: 44 y.o. male with a PMH of alcohol abuse and alcohol withdrawal who presents as a  transfer from the [facility] ED. Presents with not feeling well, tremors, nausea, dry heaving.  Per ED, family called EMS as they did not hear from him for several days. Patient reports was sober for 6 months,  he relapsed on on Saturday last week.  Was drinking 1/5th vodka daily, last drink around 7 AM today. Reports was only drinking liquids, no solid food for 3 days.Reports epigastric pain since Saturday.   Patient reports history of alcohol withdrawal, required ICU admission. Has been to alcohol rehab in the past.  Patient states he wants detox here but is not interested in alcohol rehab currently.   ED labs revealed  alcohol level 190, AG 28, bicarb 18,  initial lactic acid 6.3.  Exam:  AOX3. Hand tremors.  Tachycardia, slight epigastric tenderness.     10/25 Inpatient admission for evaluation and treatment of alcohol withdrawal, alcoholic ketoacidosis, elevated LFTs, thrombocytopenia, epigastric pain:  CIWA protocol, start low dose Librium. Telemetry.  IVF D5NS at 150 ml/hour.  Clear liquid diet, advance as tolerated. IV Pepcid. Repeat BMP, lactic acid in the a.m. Trend LFTs, monitor platelet count.  Check RUQ US. Check troponin.     10/26 Internal Medicine:  Patient has been on D5 NS at 150 mL/h which was changed to NS at 75 mL/h due to hyponatremia. Lactic acid level normalized with IV hydration. Anion gap has closed and bicarbonate level is normal. Right upper quadrant ultrasound showed gallbladder sludge without acute cholecystitis and hepatic steatosis. Telemetry unremarkable. Troponin trend was [phone] [phone], patient denies chest pain. 2D echo showed normal EF with a without any wall motion normalities. Potassium level still low 2.8 and magnesium level normal. Continue Librium 25 mg PO Q6H.  Exam:  Alert. Minimal shakiness of hands.     10/27 Discharged to home/self care.       Scheduled Medications:    bacitracin, 1 small application, Topical, Daily  famotidine, 20 mg, Intravenous, Q12H JIMMY  folic acid,  1 mg, Oral, Daily  multivitamin-minerals, 1 tablet, Oral, Daily  thiamine, 100 mg, Oral, Daily    chlordiazePOXIDE (LIBRIUM) capsule 25 mg  Dose: 25 mg  Freq: Every 6 hours scheduled Route: PO  Indications of Use: ALCOHOL WITHDRAWAL SYNDROME  Start: 10/25/24 2000 End: 10/27/24 1437      potassium chloride (Klor-Con M20) CR tablet 40 mEq  Dose: 40 mEq  Freq: Once Route: PO  Start: 10/27/24 1400 End: 10/27/24 1402    potassium chloride (Klor-Con M20) CR tablet 40 mEq  Dose: 40 mEq  Freq: Once Route: PO  Start: 10/27/24 0915 End: 10/27/24 1007    potassium phosphates 30 mmol in sodium chloride 0.9 % 250 mL infusion  Dose: 30 mmol  Freq: Once Route: IV  Last Dose: 30 mmol (10/27/24 1006)  Start: 10/27/24 1000 End: 10/27/24 1606         potassium chloride (Klor-Con M20) CR tablet 40 mEq  Dose: 40 mEq  Freq: Once Route: PO  Start: 10/26/24 1230 End: 10/26/24 1235     potassium chloride (Klor-Con M20) CR tablet 40 mEq  Dose: 40 mEq  Freq: Once Route: PO  Start: 10/26/24 1700 End: 10/26/24 1725      potassium chloride 20 mEq IVPB (premix)  Dose: 20 mEq  Freq: Once Route: IV  Last Dose: 20 mEq (10/26/24 1236)  Start: 10/26/24 1245 End: 10/26/24 1436      potassium chloride 20 mEq IVPB (premix)  Dose: 20 mEq  Freq: Once Route: IV  Last Dose: 20 mEq (10/26/24 1446)  Start: 10/26/24 1445 End: 10/26/24 1646    potassium chloride 20 mEq IVPB (premix)  Dose: 20 mEq  Freq: Once Route: IV  Last Dose: 20 mEq (10/25/24 2033)  Start: 10/25/24 2030 End: 10/25/24 2233    potassium chloride 20 mEq IVPB (premix)  Dose: 20 mEq  Freq: Once Route: IV  Last Dose: 20 mEq (10/25/24 2235)  Start: 10/25/24 2230 End: 10/26/24 0035    potassium chloride (Klor-Con M20) CR tablet 40 mEq  Dose: 40 mEq  Freq: Once Route: PO  Start: 10/25/24 1215 End: 10/25/24 1226    potassium phosphates 21 mmol in sodium chloride 0.9 % 250 mL infusion  Dose: 21 mmol  Freq: Once Route: IV  Last Dose: 21 mmol (10/25/24 2315)  Start: 10/25/24 2300 End: 10/26/24  0315    magnesium sulfate 2 g/50 mL IVPB (premix) 2 g  Dose: 2 g  Freq: Once Route: IV  Last Dose: 2 g (10/25/24 2243)  Start: 10/25/24 2145 End: 10/26/24 0243        Continuous IV Infusions:      sodium chloride 0.9 % infusion  Rate: 75 mL/hr Dose: 75 mL/hr  Freq: Continuous Route: IV  Indications of Use: IV Hydration  Last Dose: Stopped (10/27/24 1018)  Start: 10/26/24 0915 End: 10/27/24 0913    dextrose 5 % and sodium chloride 0.9 % infusion  Rate: 150 mL/hr Dose: 150 mL/hr  Freq: Continuous Route: IV  Last Dose: Stopped (10/26/24 0918)  Start: 10/26/24 0130 End: 10/26/24 0912       dextrose 5 % and sodium chloride 0.9 % infusion  Rate: 150 mL/hr Dose: 150 mL/hr  Freq: Continuous Route: IV  Last Dose: 150 mL/hr (10/25/24 2007)  Start: 10/25/24 1945 End: 10/26/24 0109         PRN Meds: None given  labetalol, 10 mg, Intravenous, Q6H PRN  ondansetron, 4 mg, Intravenous, Q6H PRN      Admission  Vitals   Temperature Pulse Respirations Blood Pressure SpO2 Pain Score   10/25/24 1919 10/25/24 1919 10/25/24 1919 10/25/24 1919 10/25/24 1919 10/25/24 2015   98.7 °F (37.1 °C) 90 18 162/88 97 % 3     Weight (last 2 days)       Date/Time Weight    10/26/24 1022 97.5 (215)    10/25/24 20:15:47 97.5 (215)                Vital Signs      Date/Time Temp Pulse Resp BP MAP (mmHg) SpO2 O2 Device Agustín Coma Scale Score CIWA-Ar Total Pain    10/27/24 15:09:21 98 °F (36.7 °C) 86 18 133/84 100 97 % -- -- -- --    10/27/24 0900 -- -- -- -- -- -- -- -- -- No Pain    10/27/24 0800 -- -- -- -- -- -- -- 15 -- --    10/27/24 07:51:56 -- 69 -- 132/84 100 96 % -- -- 0 --    10/27/24 0400 -- 60 -- 126/81 -- -- -- -- 0 --    10/27/24 0000 -- 59 -- 129/81 -- -- -- -- 0 --    10/26/24 23:33:43 -- 63 -- 126/81 96 97 % -- -- -- --    10/26/24 2100 -- 63 -- -- -- 97 % None (Room air) -- -- No Pain    10/26/24 20:04:45 -- 71 -- 115/74 88 96 % -- -- 0 --    10/26/24 18:47:35 98.4 °F (36.9 °C) 80 -- 123/60 81 94 % -- -- -- --    10/26/24 1600 -- -- --  -- -- -- -- -- 2 --    10/26/24 15:01:47 -- 63 -- 135/76 96 97 % -- -- -- --    10/26/24 1200 -- -- -- -- -- -- -- -- 2 No Pain    10/26/24 11:54:38 -- 65 19 135/76 96 97 % -- -- -- --    10/26/24 1022 -- 64 -- 139/74 -- 97 % -- -- -- --    10/26/24 0800 -- -- -- -- -- -- -- -- 3 --    10/26/24 07:17:48 97.7 °F (36.5 °C) 70 17 139/74 96 97 % -- -- -- --    10/26/24 05:18:14 -- 87 18 139/71 94 95 % -- -- -- --    10/26/24 0400 -- -- -- 139/71 -- -- -- -- 4 --    10/26/24 02:49:32 98.5 °F (36.9 °C) 83 16 138/71 93 95 % -- -- -- --    10/26/24 0000 -- -- -- 138/79 -- -- -- -- 4 --    10/25/24 22:53:39 -- 86 18 138/76 97 97 % -- -- -- --    10/25/24 20:15:47 98.1 °F (36.7 °C) 90 18 160/85 110 94 % -- 15 -- 3    10/25/24 2000 -- -- -- 160/85 -- -- -- -- 7 --    10/25/24 19:19:59 98.7 °F (37.1 °C) 90 18 162/88 113 97 % -- -- -- --          Weight (last 2 days) before discharge       Date/Time Weight    10/26/24 1022 97.5 (215)    10/25/24 20:15:47 97.5 (215)           JORGEWA-Ar Score       Row Name 10/27/24 07:51:56 10/27/24 0400 10/27/24 0000       [facility]    BP -- 126/81 129/81    Pulse -- 60 59    Nausea and Vomiting 0 0 0    Tactile Disturbances 0 0 0    Tremor 0 0 0    Auditory Disturbances 0 0 0    Paroxysmal Sweats 0 0 0    Visual Disturbances 0 0 0    Anxiety 0 0 0    Headache, Fullness in Head 0 0 0    Agitation 0 0 0    Orientation and Clouding of Sensorium 0 0 0    CIWA-Ar Total 0 0 0      Row Name 10/26/24 20:04:45 10/26/24 1600 10/26/24 1200       [facility]    Nausea and Vomiting 0 0 0    Tactile Disturbances 0 0 0    Tremor 0 2 2    Auditory Disturbances 0 0 0    Paroxysmal Sweats 0 0 0    Visual Disturbances 0 0 0    Anxiety 0 0 0    Headache, Fullness in Head 0 0 0    Agitation 0 0 0    Orientation and Clouding of Sensorium 0 0 0    CIWA-Ar Total 0 2 2      Row Name 10/26/24 0800 10/26/24 0400 10/26/24 0000       [facility]    BP -- 139/71 138/79    Nausea and Vomiting 0 0 0    Tactile Disturbances 0 0 0     Tremor 3 3 3    Auditory Disturbances 0 0 0    Paroxysmal Sweats 0 1 1    Visual Disturbances 0 0 0    Anxiety 0 0 0    Headache, Fullness in Head 0 0 0    Agitation 0 0 0    Orientation and Clouding of Sensorium 0 0 0    CIWA-Ar Total 3 4 4      Row Name 10/25/24 2000             CIWA-Ar    /85      Nausea and Vomiting 1      Tactile Disturbances 0      Tremor 3      Auditory Disturbances 0      Paroxysmal Sweats 1      Visual Disturbances 0      Anxiety 0      Headache, Fullness in Head 2      Agitation 0      Orientation and Clouding of Sensorium 0      CIWA-Ar Total 7                    Pertinent Labs/Diagnostic Results:     Radiology:  US abdomen limited   Final Interpretation by Kei Harris MD (10/26 1425)      Hepatic steatosis.      Gallbladder sludge without acute cholecystitis.                  Workstation performed: [id number]           Cardiology:  Echo complete w/ contrast if indicated   Final Result by Shelia Dumont MD (10/26 1102)        Left Ventricle: Left ventricular cavity size is normal. Wall thickness    is normal. The left ventricular ejection fraction is 65%. Systolic    function is normal. Wall motion is normal. Diastolic function is normal.     Right Ventricle: Systolic function is normal.     Tricuspid Valve: There is trace regurgitation.     Aorta: The aortic root is mildly dilated.         10/25 EKG:    Normal sinus rhythm  Possible Inferior infarct , age undetermined  Abnormal ECG  No previous ECGs available      Results from last 7 days   Lab Units 10/27/24  0550 10/26/24  0516 10/25/24  1138   WBC Thousand/uL 4.70 7.39 8.14   HEMOGLOBIN g/dL 14.6 13.1 16.7   HEMATOCRIT % 40.4 35.4* 45.8   PLATELETS Thousands/uL 43* 48* 82*   TOTAL NEUT ABS Thousands/µL  --   --  6.34         Results from last 7 days   Lab Units 10/27/24  1514 10/27/24  0550 10/26/24  2004 10/26/24  1054 10/26/24  0516 10/26/24  0021 10/25/24  2050 10/25/24  1138   SODIUM mmol/L 135 139 131* 130* 129*  129*    < > 128* 131*   POTASSIUM mmol/L 3.8 3.0* 3.3* 2.8* 3.0*  3.0*   < > 2.9* 3.0*   CHLORIDE mmol/L 99 101 96 94* 93*  93*   < > 90* 85*   CO2 mmol/L 30 31 26 27 25  25   < > 20* 18*   ANION GAP mmol/L 6 7 9 9 11  11   < > 18* 28*   BUN mg/dL 9 6 7 9 10  10   < > 12 15   CREATININE mg/dL 0.77 0.77 0.78 0.72 0.80  0.78   < > 0.76 0.92   EGFR ml/min/1.73sq m 111 111 111 115 110  111   < > 112 102   CALCIUM mg/dL 9.1 8.7 8.9 7.5* 7.7*  7.7*   < > 7.9* 8.9   MAGNESIUM mg/dL  --  2.0  --   --  1.9  --  1.5* 2.3   PHOSPHORUS mg/dL 2.6* 1.2*  --   --   --   --  1.1*  --     < > = values in this interval not displayed.     Results from last 7 days   Lab Units 10/27/24  0550 10/26/24  0516 10/25/24  1138   AST U/L 171* 212* 313*   ALT U/L 178* 189* 290*   ALK PHOS U/L 56 55 85   TOTAL PROTEIN g/dL 6.0* 5.6* 7.5   ALBUMIN g/dL 3.9 3.8 5.0   TOTAL BILIRUBIN mg/dL 1.73* 1.42* 1.46*         Results from last 7 days   Lab Units 10/27/24  1514 10/27/24  0550 10/26/24  2004 10/26/24  1054 10/26/24  0516 10/26/24  0021 10/25/24  2050 10/25/24  1138   GLUCOSE RANDOM mg/dL 104 117 134 145* 174*  174* 123 147* 135     Results from last 7 days   Lab Units 10/26/24  0021   OSMOLALITY, SERUM mmol/     Results from last 7 days   Lab Units 10/25/24  1138   HEMOGLOBIN A1C % 5.5   EAG mg/dl 111     Beta- Hydroxybutyrate   Date Value Ref Range Status   10/26/2024 2.01 (H) 0.02 - 0.27 mmol/L Final   10/25/2024 4.75 (H) 0.02 - 0.27 mmol/L Final          Results from last 7 days   Lab Units 10/25/24  1245   PH CARLOS ALBERTO  7.405*   PCO2 CARLOS ALBERTO mm Hg 29.9*   PO2 CARLOS ALBERTO mm Hg 92.5*   HCO3 CARLOS ALBERTO mmol/L 18.3*   BASE EXC CARLOS ALBERTO mmol/L -4.9   O2 CONTENT CARLOS ALBERTO ml/dL 21.1   O2 HGB, VENOUS % 94.7*             Results from last 7 days   Lab Units 10/26/24  0516 10/26/24  0238 10/26/24  0021   HS TNI 0HR ng/L  --   --  321*   HS TNI 2HR ng/L  --  6  --    HSTNI D2 ng/L  --  -315  --    HS TNI 4HR ng/L 193*  --   --    HSTNI D4 ng/L -128  --   --          Results  from last 7 days   Lab Units 10/25/24  1220   PROTIME seconds 13.4   INR  0.98   PTT seconds 25     Results from last 7 days   Lab Units 10/26/24  0516   TSH 3RD GENERATON uIU/mL 3.143         Results from last 7 days   Lab Units 10/26/24  0238 10/26/24  0021 10/25/24  1706 10/25/24  1352 10/25/24  1220   LACTIC ACID mmol/L 1.5 2.9* 4.1* 5.7* 6.3*                 Results from last 7 days   Lab Units 10/25/24  1138   LIPASE u/L 54             Results from last 7 days   Lab Units 10/26/24  0021 10/25/24  1327   OSMOLALITY, SERUM mmol/  --    OSMO UR mmol/KG  --  823     Results from last 7 days   Lab Units 10/25/24  1327   CLARITY UA  Clear   COLOR UA  Kia*   SPEC GRAV UA  >=1.030   PH UA  6.0   GLUCOSE UA mg/dl Negative   KETONES UA mg/dl 80 (3+)*   BLOOD UA  3+*   PROTEIN UA mg/dl 2+*   NITRITE UA  Negative   BILIRUBIN UA  1+*   UROBILINOGEN UA E.U./dl 0.2   LEUKOCYTES UA  Negative   WBC UA /hpf None Seen   RBC UA /hpf 1-2   BACTERIA UA /hpf Occasional   EPITHELIAL CELLS WET PREP /hpf Occasional   SODIUM UR  15              Results from last 7 days   Lab Units 10/25/24  1706   ETHANOL LVL mg/dL 190*              Past Medical History:   Diagnosis Date    Alcohol abuse          Admitting Diagnosis: Alcoholic ketoacidosis [E87.29]  Age/Sex: 44 y.o. male        Network Utilization Review Department  ATTENTION: Please call with any questions or concerns to [phone] and carefully listen to the prompts so that you are directed to the right person. All voicemails are confidential.   For Discharge needs, contact Care Management DC Support Team at [phone] opt. [phone]  Send all requests for admission clinical reviews, approved or denied determinations and any other requests to dedicated fax number below belonging to the campus where the patient is receiving treatment. List of dedicated fax numbers for the Facilities:  FACILITY NAME UR FAX NUMBER   ADMISSION DENIALS (Administrative/Medical Necessity) [phone]    DISCHARGE SUPPORT TEAM (NETWORK) [phone]   PARENT CHILD HEALTH (Maternity/NICU/Pediatrics) [phone]   [facility] [phone]   [facility] [phone]   [facility] [phone]   [facility] [phone]   [facility] [phone]   [facility] [phone]   [facility] [phone]   [facility] [phone]   [facility] [phone]   [facility] [phone]   [facility] [phone]   [facility] [phone]

## 2024-10-27 NOTE — PLAN OF CARE
Problem: PAIN - ADULT  Goal: Verbalizes/displays adequate comfort level or baseline comfort level  Description: Interventions:  - Encourage patient to monitor pain and request assistance  - Assess pain using appropriate pain scale  - Administer analgesics based on type and severity of pain and evaluate response  - Implement non-pharmacological measures as appropriate and evaluate response  - Consider cultural and social influences on pain and pain management  - Notify physician/advanced practitioner if interventions unsuccessful or patient reports new pain  Outcome: Progressing     Problem: INFECTION - ADULT  Goal: Absence or prevention of progression during hospitalization  Description: INTERVENTIONS:  - Assess and monitor for signs and symptoms of infection  - Monitor lab/diagnostic results  - Monitor all insertion sites, i.e. indwelling lines, tubes, and drains  - Monitor endotracheal if appropriate and nasal secretions for changes in amount and color  - [place] appropriate cooling/warming therapies per order  - Administer medications as ordered  - Instruct and encourage patient and family to use good hand hygiene technique  - Identify and instruct in appropriate isolation precautions for identified infection/condition  Outcome: Progressing  Goal: Absence of fever/infection during neutropenic period  Description: INTERVENTIONS:  - Monitor WBC    Outcome: Progressing     Problem: SAFETY ADULT  Goal: Patient will remain free of falls  Description: INTERVENTIONS:  - Educate patient/family on patient safety including physical limitations  - Instruct patient to call for assistance with activity   - Consult OT/PT to assist with strengthening/mobility   - Keep Call bell within reach  - Keep bed low and locked with side rails adjusted as appropriate  - Keep care items and personal belongings within reach  - Initiate and maintain comfort rounds  - Make Fall Risk Sign visible to staff  - Offer Toileting every 2 Hours,  in advance of need  - Initiate/Maintain bed alarm  - Obtain necessary fall risk management equipment:   - Apply yellow socks and bracelet for high fall risk patients  - Consider moving patient to room near nurses station  Outcome: Progressing  Goal: Maintain or return to baseline ADL function  Description: INTERVENTIONS:  -  Assess patient's ability to carry out ADLs; assess patient's baseline for ADL function and identify physical deficits which impact ability to perform ADLs (bathing, care of mouth/teeth, toileting, grooming, dressing, etc.)  - Assess/evaluate cause of self-care deficits   - Assess range of motion  - Assess patient's mobility; develop plan if impaired  - Assess patient's need for assistive devices and provide as appropriate  - Encourage maximum independence but intervene and supervise when necessary  - Involve family in performance of ADLs  - Assess for home care needs following discharge   - Consider OT consult to assist with ADL evaluation and planning for discharge  - Provide patient education as appropriate  Outcome: Progressing  Goal: Maintains/Returns to pre admission functional level  Description: INTERVENTIONS:  - Perform AM-PAC 6 Click Basic Mobility/ Daily Activity assessment daily.  - Set and communicate daily mobility goal to care team and patient/family/caregiver.   - Collaborate with rehabilitation services on mobility goals if consulted  - Perform Range of Motion 3 times a day.  - Reposition patient every 2 hours.  - Dangle patient 3 times a day  - Stand patient 3 times a day  - Ambulate patient 3 times a day  - Out of bed to chair 3 times a day   - Out of bed for meals 3 times a day  - Out of bed for toileting  - Record patient progress and toleration of activity level   Outcome: Progressing     Problem: DISCHARGE PLANNING  Goal: Discharge to home or other facility with appropriate resources  Description: INTERVENTIONS:  - Identify barriers to discharge w/patient and caregiver  -  Arrange for needed discharge resources and transportation as appropriate  - Identify discharge learning needs (meds, wound care, etc.)  - Arrange for interpretive services to assist at discharge as needed  - Refer to Case Management Department for coordinating discharge planning if the patient needs post-hospital services based on physician/advanced practitioner order or complex needs related to functional status, cognitive ability, or social support system  Outcome: Progressing     Problem: Knowledge Deficit  Goal: Patient/family/caregiver demonstrates understanding of disease process, treatment plan, medications, and discharge instructions  Description: Complete learning assessment and assess knowledge base.  Interventions:  - Provide teaching at level of understanding  - Provide teaching via preferred learning methods  Outcome: Progressing     Problem: METABOLIC, FLUID AND ELECTROLYTES - ADULT  Goal: Electrolytes maintained within normal limits  Description: INTERVENTIONS:  - Monitor labs and assess patient for signs and symptoms of electrolyte imbalances  - Administer electrolyte replacement as ordered  - Monitor response to electrolyte replacements, including repeat lab results as appropriate  - Instruct patient on fluid and nutrition as appropriate  Outcome: Progressing  Goal: Fluid balance maintained  Description: INTERVENTIONS:  - Monitor labs   - Monitor I/O and WT  - Instruct patient on fluid and nutrition as appropriate  - Assess for signs & symptoms of volume excess or deficit  Outcome: Progressing  Goal: Glucose maintained within target range  Description: INTERVENTIONS:  - Monitor Blood Glucose as ordered  - Assess for signs and symptoms of hyperglycemia and hypoglycemia  - Administer ordered medications to maintain glucose within target range  - Assess nutritional intake and initiate nutrition service referral as needed  Outcome: Progressing     Problem: Nutrition/Hydration-ADULT  Goal:  Nutrient/Hydration intake appropriate for improving, restoring or maintaining nutritional needs  Description: Monitor and assess patient's nutrition/hydration status for malnutrition. Collaborate with interdisciplinary team and initiate plan and interventions as ordered.  Monitor patient's weight and dietary intake as ordered or per policy. Utilize nutrition screening tool and intervene as necessary. Determine patient's food preferences and provide high-protein, high-caloric foods as appropriate.     INTERVENTIONS:  - Monitor oral intake, urinary output, labs, and treatment plans  - Assess nutrition and hydration status and recommend course of action  - Evaluate amount of meals eaten  - Assist patient with eating if necessary   - Allow adequate time for meals  - Recommend/ encourage appropriate diets, oral nutritional supplements, and vitamin/mineral supplements  - Order, calculate, and assess calorie counts as needed  - Recommend, monitor, and adjust tube feedings and TPN/PPN based on assessed needs  - Assess need for intravenous fluids  - Provide specific nutrition/hydration education as appropriate  - Include patient/family/caregiver in decisions related to nutrition  Outcome: Progressing

## 2024-10-28 LAB
ATRIAL RATE: 85 BPM
ATRIAL RATE: 89 BPM
P AXIS: 47 DEGREES
P AXIS: 57 DEGREES
PR INTERVAL: 152 MS
PR INTERVAL: 158 MS
QRS AXIS: 28 DEGREES
QRS AXIS: 31 DEGREES
QRSD INTERVAL: 98 MS
QRSD INTERVAL: 98 MS
QT INTERVAL: 380 MS
QT INTERVAL: 388 MS
QTC INTERVAL: 461 MS
QTC INTERVAL: 462 MS
T WAVE AXIS: 39 DEGREES
T WAVE AXIS: 50 DEGREES
VENTRICULAR RATE: 85 BPM
VENTRICULAR RATE: 89 BPM

## 2024-10-28 PROCEDURE — 93010 ELECTROCARDIOGRAM REPORT: CPT | Performed by: INTERNAL MEDICINE

## 2024-10-28 NOTE — UTILIZATION REVIEW
NOTIFICATION OF INPATIENT ADMISSION   AUTHORIZATION REQUEST   SERVICING FACILITY:   [place] [address]  Tax ID: [id number]  NPI: [id number] ATTENDING PROVIDER:  Attending Name and NPI#: Narciso Conte Md [[id number]]  Address: [address] [place] [address]  Phone: [phone]   ADMISSION INFORMATION:  Place of Service: Inpatient [facility]  Place of Service Code: 21  Inpatient Admission Date/Time: 10/25/24  7:14 PM  Discharge Date/Time: 10/27/2024  5:57 PM  Admitting Diagnosis Code/Description:  Alcoholic ketoacidosis [E87.29]     UTILIZATION REVIEW CONTACT:  Susan Milligan Utilization   Network Utilization Review Department  Phone: [phone]  Fax [phone]  Email: [email or web address]  Contact for approvals/pending authorizations, clinical reviews, and discharge.     PHYSICIAN ADVISORY SERVICES:  Medical Necessity Denial & Poay-dl-Ngns Review  Phone: [phone]  Fax: [phone]  Email: [email or web address]     DISCHARGE SUPPORT TEAM:  For Patients Discharge Needs & Updates  Phone: [phone] opt. 2 Fax: [phone]  Email: [email or web address]

## 2024-10-31 NOTE — UTILIZATION REVIEW
NOTIFICATION OF ADMISSION DISCHARGE   This is a Notification of Discharge from [facility]. Please be advised that this patient has been discharge from our facility. Below you will find the admission and discharge date and time including the patient’s disposition.   UTILIZATION REVIEW CONTACT:  Cee Milligan  Utilization   Network Utilization Review Department  Phone: [phone] x carefully listen to the prompts. All voicemails are confidential.  Email: [email or web address]     ADMISSION INFORMATION  PRESENTATION DATE: 10/25/2024  7:14 PM  OBERVATION ADMISSION DATE: N/A  INPATIENT ADMISSION DATE: 10/25/24  7:14 PM   DISCHARGE DATE: 10/27/2024  5:57 PM   DISPOSITION:Home/Self Care    Network Utilization Review Department  ATTENTION: Please call with any questions or concerns to [phone] and carefully listen to the prompts so that you are directed to the right person. All voicemails are confidential.   For Discharge needs, contact Care Management DC Support Team at [phone] opt. 2  Send all requests for admission clinical reviews, approved or denied determinations and any other requests to dedicated fax number below belonging to the campus where the patient is receiving treatment. List of dedicated fax numbers for the Facilities:  FACILITY NAME UR FAX NUMBER   ADMISSION DENIALS (Administrative/Medical Necessity) [phone]   DISCHARGE SUPPORT TEAM ([facility]) [phone]   PARENT CHILD HEALTH (Maternity/NICU/Pediatrics) [phone]   [facility] [phone]   [facility] [phone]   [facility] [phone]   [facility] [phone]   [facility] [phone]   [facility] [phone]   [facility] [phone]   [facility]  [phone]   [facility] [phone]   [facility] [phone]   [facility] [phone]   [facility] [phone]